# Patient Record
Sex: FEMALE | Race: AMERICAN INDIAN OR ALASKA NATIVE | NOT HISPANIC OR LATINO | Employment: FULL TIME | URBAN - METROPOLITAN AREA
[De-identification: names, ages, dates, MRNs, and addresses within clinical notes are randomized per-mention and may not be internally consistent; named-entity substitution may affect disease eponyms.]

---

## 2019-01-30 ENCOUNTER — TELEPHONE (OUTPATIENT)
Dept: SCHEDULING | Facility: IMAGING CENTER | Age: 24
End: 2019-01-30

## 2019-03-18 ENCOUNTER — OFFICE VISIT (OUTPATIENT)
Dept: MEDICAL GROUP | Facility: PHYSICIAN GROUP | Age: 24
End: 2019-03-18
Payer: COMMERCIAL

## 2019-03-18 ENCOUNTER — HOSPITAL ENCOUNTER (OUTPATIENT)
Facility: MEDICAL CENTER | Age: 24
End: 2019-03-18
Attending: NURSE PRACTITIONER
Payer: COMMERCIAL

## 2019-03-18 VITALS
HEART RATE: 84 BPM | DIASTOLIC BLOOD PRESSURE: 78 MMHG | HEIGHT: 62 IN | BODY MASS INDEX: 24.66 KG/M2 | OXYGEN SATURATION: 98 % | WEIGHT: 134 LBS | RESPIRATION RATE: 12 BRPM | TEMPERATURE: 98 F | SYSTOLIC BLOOD PRESSURE: 100 MMHG

## 2019-03-18 DIAGNOSIS — Z32.02 PREGNANCY TEST NEGATIVE: ICD-10-CM

## 2019-03-18 DIAGNOSIS — Z76.89 ESTABLISHING CARE WITH NEW DOCTOR, ENCOUNTER FOR: ICD-10-CM

## 2019-03-18 DIAGNOSIS — Z11.3 SCREENING FOR STDS (SEXUALLY TRANSMITTED DISEASES): ICD-10-CM

## 2019-03-18 DIAGNOSIS — Z30.09 COUNSELING FOR BIRTH CONTROL, ORAL CONTRACEPTIVES: ICD-10-CM

## 2019-03-18 DIAGNOSIS — Z00.00 ROUTINE HEALTH MAINTENANCE: ICD-10-CM

## 2019-03-18 DIAGNOSIS — R53.82 CHRONIC FATIGUE: ICD-10-CM

## 2019-03-18 LAB
INT CON NEG: NEGATIVE
INT CON POS: POSITIVE
POC URINE PREGNANCY TEST: NEGATIVE

## 2019-03-18 PROCEDURE — 87591 N.GONORRHOEAE DNA AMP PROB: CPT

## 2019-03-18 PROCEDURE — 81025 URINE PREGNANCY TEST: CPT | Performed by: NURSE PRACTITIONER

## 2019-03-18 PROCEDURE — 87491 CHLMYD TRACH DNA AMP PROBE: CPT

## 2019-03-18 PROCEDURE — 99202 OFFICE O/P NEW SF 15 MIN: CPT | Performed by: NURSE PRACTITIONER

## 2019-03-18 RX ORDER — NORGESTIMATE AND ETHINYL ESTRADIOL
KIT
Refills: 6 | COMMUNITY
Start: 2019-02-14 | End: 2019-03-18

## 2019-03-18 RX ORDER — ETHYNODIOL DIACETATE AND ETHINYL ESTRADIOL 1 MG-35MCG
1 KIT ORAL DAILY
Qty: 28 TAB | Status: CANCELLED | OUTPATIENT
Start: 2019-03-18

## 2019-03-18 RX ORDER — ACETAMINOPHEN AND CODEINE PHOSPHATE 120; 12 MG/5ML; MG/5ML
1 SOLUTION ORAL DAILY
Qty: 28 TAB | Status: CANCELLED | OUTPATIENT
Start: 2019-03-18

## 2019-03-18 RX ORDER — NORETHINDRONE ACETATE AND ETHINYL ESTRADIOL 1.5-30(21)
1 KIT ORAL DAILY
Qty: 84 TAB | Refills: 3 | Status: SHIPPED | OUTPATIENT
Start: 2019-03-18 | End: 2019-08-09 | Stop reason: SINTOL

## 2019-03-18 ASSESSMENT — PATIENT HEALTH QUESTIONNAIRE - PHQ9: CLINICAL INTERPRETATION OF PHQ2 SCORE: 0

## 2019-03-18 NOTE — LETTER
[facility]  MATTHEW Ames  [address]  [place] [address]  Fax: [phone]   Authorization for Release/Disclosure of   Protected Health Information   Name: KARUNA JIM : 1995 SSN: [id number]   Address: [address] [place] [address]  [place] [address] Phone:    [phone] (home)    I authorize the entity listed below to release/disclose the PHI below to:   [facility]/MATTHEW Ames and MATTHEW Ames   Provider or Entity Name:  [facility]   Address   City, State, Zip   Phone:      Fax:     Reason for request: continuity of care   Information to be released:    [  ] LAST COLONOSCOPY,  including any PATH REPORT and follow-up  [  ] LAST FIT/COLOGUARD RESULT [  ] LAST DEXA  [  ] LAST MAMMOGRAM  [X] LAST PAP  [  ] LAST LABS [  ] RETINA EXAM REPORT  [  ] IMMUNIZATION RECORDS  [  ] Release all info      [  ] Check here and initial the line next to each item to release ALL health information INCLUDING  _____ Care and treatment for drug and / or alcohol abuse  _____ HIV testing, infection status, or AIDS  _____ Genetic Testing    DATES OF SERVICE OR TIME PERIOD TO BE DISCLOSED: _____________  I understand and acknowledge that:  * This Authorization may be revoked at any time by you in writing, except if your health information has already been used or disclosed.  * Your health information that will be used or disclosed as a result of you signing this authorization could be re-disclosed by the recipient. If this occurs, your re-disclosed health information may no longer be protected by State or Federal laws.  * You may refuse to sign this Authorization. Your refusal will not affect your ability to obtain treatment.  * This Authorization becomes effective upon signing and will  on (date) __________.      If no date is indicated, this Authorization will  one (1) year from the signature date.    Name: Karuna Jim    Signature:   Date:      3/18/2019       PLEASE FAX REQUESTED RECORDS BACK TO: [phone]

## 2019-03-18 NOTE — PROGRESS NOTES
CC:   Chief Complaint   Patient presents with   • Contraception     new patient, birth control, requesting labs       HISTORY OF THE PRESENT ILLNESS: Patient is a 23 y.o. female. This pleasant patient is here today to establish care and discuss issues as listed below.    Health Maintenance: Completed    Chronic fatigue  This is a chronic problem for the patient that is ongoing.  Patient reports that she has been experiencing fatigue for a while.  It was recommended to her to have some lab work checked to see if there may be some answers in her labs.  The patient is finishing up her last 2 college classes which are online.  Patient does work at [employer] at the .    Allergies: Patient has no known allergies.    Current Outpatient Prescriptions Ordered in [facility]   Medication Sig Dispense Refill   • norethindrone-ethinyl estradiol-iron (MICROGESTIN FE1.5/30) 1.5-30 MG-MCG tablet Take 1 Tab by mouth every day. 84 Tab 3     No current Epic-ordered facility-administered medications on file.        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       Past Surgical History:   Procedure Laterality Date   • MENISCUS REPAIR Right 2011       Social History   Substance Use Topics   • Smoking status: Never Smoker   • Smokeless tobacco: Never Used   • Alcohol use Yes      Comment: socially       Social History     Social History Narrative   • No narrative on file       Family History   Problem Relation Age of Onset   • No Known Problems Mother    • No Known Problems Father    • Alcohol/Drug Sister         drugs   • Obesity Brother    • Diabetes Maternal Grandmother    • Cancer Maternal Grandfather         pancreatic cancer   • GI Paternal Grandmother         Liver   • Arthritis Paternal Grandmother    • Other Paternal Grandmother         Chronic back pain   • No Known Problems Paternal Grandfather        ROS:   Constitutional: Positive for fatigue.   "Negative for fever, chills, unexpected weight change, night sweats, body aches, sleep issues.   HEENT:  Negative for headaches, vision changes, hearing changes, ear pain, tinnitus, ear discharge, rhinorrhea, sinus congestion, sneezing, sore throat, and neck pain.    Respiratory:  Negative for cough, shortness of breath, sputum production, hemoptysis, chest congestion, dyspnea, wheezing, and crackles.    Cardiovascular:  Negative for chest pain, palpitations, FRANCISCO, paroxsymal nocturnal dyspnea, orthopnea, and bilateral lower extremity edema.   Gastrointestinal:  Negative for heartburn, nausea, vomiting, abdominal pain, hematochezia, melena, diarrhea, constipation, and greasy/foul-smelling stools.   Genitourinary:  Negative for dysuria, nocturia, polyuria, hematuria, pyuria, urinary urgency, urinary frequency, and urinary incontinence.   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  Negative for myalgias, back pain, and joint pain.   Skin:  Negative for rash, sores, lumps, itching, cyanotic skin color change.   Neurological:  Negative for dizziness, tingling, tremors, focal sensory deficit, focal weakness and headaches.   Endo/Heme/Allergies:  Does not bruise/bleed easily. Denies cold/heat intolerance.   Psychiatric/Behavioral: Negative for depression, suicidal/homicidal ideation and memory loss.        Exam: Blood pressure 100/78, pulse 84, temperature 36.7 °C (98 °F), resp. rate 12, height 1.575 m (5' 2\"), weight 60.8 kg (134 lb), SpO2 98 %. Body mass index is 24.51 kg/m².    General:  Normal appearing. No distress.  HEENT:  Normocephalic. Eyes conjunctiva clear lids without ptosis, pupils equal and reactive to light accommodation, ears normal shape and contour, canals are clear bilaterally, tympanic membranes are benign, nasal mucosa benign, oropharynx is without erythema, edema or exudates.   Neck:  Supple without JVD or bruit. Thyroid is not enlarged.  Pulmonary:  Clear to ausculation. "  Normal effort. No rales, ronchi, or wheezing.  Cardiovascular:  Regular rate and rhythm without murmur. Carotid and radial pulses are intact and equal bilaterally.  Abdomen:  Soft, nontender, nondistended. Normal bowel sounds.  Neurologic:  Grossly nonfocal.  Skin:  Warm and dry.  No obvious lesions.  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  Normal gait. No extremity cyanosis, clubbing, or edema.  Psych: Normal mood and affect. Alert and oriented x3. Judgment and insight is normal.    Assessment/Plan:  1. Chronic fatigue  CBC WITHOUT DIFFERENTIAL    VITAMIN D,25 HYDROXY    VITAMIN B12    TSH WITH REFLEX TO FT4    FERRITIN    IRON/TOTAL IRON BIND    TRANSFERRIN    Basic Metabolic Panel   2. Screening for STDs (sexually transmitted diseases)  CHLAMYDIA/GC PCR URINE OR SWAB   3. Counseling for birth control, oral contraceptives  norethindrone-ethinyl estradiol-iron (MICROGESTIN FE1.5/30) 1.5-30 MG-MCG tablet   4. Pregnancy test negative  POCT Pregnancy - negative   5. Routine health maintenance     6. Establishing care with new doctor, encounter for       Educated in proper administration of medication(s) ordered today including safety, possible SE, risks, benefits, rationale and alternatives to therapy.   Supportive care, differential diagnoses, and indications for immediate follow-up discussed with patient.    Pathogenesis of diagnosis discussed including typical length and natural progression.    Instructed to return to clinic or nearest emergency department for any change in condition, further concerns, or worsening of symptoms.  Patient states understanding of the plan of care and discharge instructions.    Consent for records release signed to order medical records from previous PCP, Dr. Jennie Vaughn, and [facility] for recent Pap smear.    Return for Follow up Labs.    Please note that this dictation  was created using voice recognition software. I have made every reasonable attempt to correct obvious errors, but I expect that there are errors of grammar and possibly content that I did not discover before finalizing the note.

## 2019-03-18 NOTE — ASSESSMENT & PLAN NOTE
This is a chronic problem for the patient that is ongoing.  Patient reports that she has been experiencing fatigue for a while.  It was recommended to her to have some lab work checked to see if there may be some answers in her labs.  The patient is finishing up her last 2 college classes which are online.  Patient does work at [employer] at the .

## 2019-03-18 NOTE — LETTER
[facility]  MATTHEW Ames  [address]  [place] [address]  Fax: [phone]   Authorization for Release/Disclosure of   Protected Health Information   Name: KARUNA JIM : 1995 SSN: [id number]   Address: [address] [place] [address]  [place] [address] Phone:    [phone] (home)    I authorize the entity listed below to release/disclose the PHI below to:   [facility]/MATTHEW Ames and MATTHEW Ames   Provider or Entity Name:  Dr. Jennie Vaughn   [facility]   Phone: [phone]      Fax: [phone]     Reason for request: continuity of care   Information to be released:    [  ] LAST COLONOSCOPY,  including any PATH REPORT and follow-up  [  ] LAST FIT/COLOGUARD RESULT [  ] LAST DEXA  [  ] LAST MAMMOGRAM  [  ] LAST PAP  [  ] LAST LABS [  ] RETINA EXAM REPORT  [  ] IMMUNIZATION RECORDS  [  ] Release all info      [  ] Check here and initial the line next to each item to release ALL health information INCLUDING  _____ Care and treatment for drug and / or alcohol abuse  _____ HIV testing, infection status, or AIDS  _____ Genetic Testing    DATES OF SERVICE OR TIME PERIOD TO BE DISCLOSED: _____________  I understand and acknowledge that:  * This Authorization may be revoked at any time by you in writing, except if your health information has already been used or disclosed.  * Your health information that will be used or disclosed as a result of you signing this authorization could be re-disclosed by the recipient. If this occurs, your re-disclosed health information may no longer be protected by State or Federal laws.  * You may refuse to sign this Authorization. Your refusal will not affect your ability to obtain treatment.  * This Authorization becomes effective upon signing and will  on (date) __________.      If no date is indicated, this Authorization will  one (1) year from the signature date.    Name: Karuna  Acosta    Signature:   Date:     3/18/2019       PLEASE FAX REQUESTED RECORDS BACK TO: [phone]

## 2019-03-19 LAB
C TRACH DNA SPEC QL NAA+PROBE: NEGATIVE
N GONORRHOEA DNA SPEC QL NAA+PROBE: NEGATIVE
SPECIMEN SOURCE: NORMAL

## 2019-04-17 ENCOUNTER — APPOINTMENT (OUTPATIENT)
Dept: MEDICAL GROUP | Facility: PHYSICIAN GROUP | Age: 24
End: 2019-04-17
Payer: COMMERCIAL

## 2019-07-02 ENCOUNTER — OFFICE VISIT (OUTPATIENT)
Dept: MEDICAL GROUP | Facility: PHYSICIAN GROUP | Age: 24
End: 2019-07-02
Payer: COMMERCIAL

## 2019-07-02 VITALS
HEIGHT: 62 IN | BODY MASS INDEX: 26.68 KG/M2 | HEART RATE: 74 BPM | RESPIRATION RATE: 12 BRPM | DIASTOLIC BLOOD PRESSURE: 82 MMHG | TEMPERATURE: 98.9 F | SYSTOLIC BLOOD PRESSURE: 106 MMHG | OXYGEN SATURATION: 99 % | WEIGHT: 145 LBS

## 2019-07-02 DIAGNOSIS — R53.82 CHRONIC FATIGUE: ICD-10-CM

## 2019-07-02 DIAGNOSIS — R63.5 WEIGHT GAIN: ICD-10-CM

## 2019-07-02 DIAGNOSIS — Z11.3 SCREENING FOR STDS (SEXUALLY TRANSMITTED DISEASES): ICD-10-CM

## 2019-07-02 DIAGNOSIS — L81.4 SOLAR LENTIGO: ICD-10-CM

## 2019-07-02 PROBLEM — K13.0 LIP LESION: Status: ACTIVE | Noted: 2019-07-02

## 2019-07-02 PROCEDURE — 99214 OFFICE O/P EST MOD 30 MIN: CPT | Performed by: NURSE PRACTITIONER

## 2019-07-03 NOTE — ASSESSMENT & PLAN NOTE
"This is a chronic problem for the patient that has been present for 1 to 2 years.  Patient is affected on her upper lip and right forearm.  The patient was traveling to [place] last week and had a sunburn and noticed that the discoloring on her upper lip had become darker, larger, and raised.  Patient denies any pain or itching to the areas, states that she is only aware that it is there because she sees it.  Patient reports that \"liver spots run in her family\" and it was recommended to her to have some lab work completed.  "

## 2019-07-04 PROBLEM — R63.5 WEIGHT GAIN: Status: ACTIVE | Noted: 2019-07-04

## 2019-07-04 NOTE — ASSESSMENT & PLAN NOTE
"This is a new problem for the patient that is worsening.  The patient has had an 11 pound weight gain in the last 4 months despite healthy diet and exercise.  Patient is requesting lab work.  Vitals 3/18/2019 7/2/2019   WEIGHT 134 145   HEIGHT 5' 2\" 5' 2\"   BMI 24.51 kg/m2 26.52 kg/m2     "

## 2019-07-04 NOTE — ASSESSMENT & PLAN NOTE
This is a chronic problem for the patient that is ongoing.  Patient reports that she has been experiencing fatigue for a while. The patient has also been experiencing weight gain despite healthy diet and exercise.

## 2019-07-04 NOTE — PROGRESS NOTES
"CC:   Chief Complaint   Patient presents with   • Nevus     freckles around lips, peeling, family history liver spots, weight gain     HISTORY OF THE PRESENT ILLNESS: Patient is a 23 y.o. female. This pleasant patient is here today to discuss skin color changes on upper lip and request STI screening d/t new partner.    Health Maintenance: Completed    Solar lentigo  This is a chronic problem for the patient that has been present for 1 to 2 years.  Patient is affected on her upper lip and right forearm.  The patient was traveling to [place] last week and had a sunburn and noticed that the discoloring on her upper lip had become darker, larger, and raised.  Patient denies any pain or itching to the areas, states that she is only aware that it is there because she sees it.  Patient reports that \"liver spots run in her family\" and it was recommended to her to have some lab work completed.    Chronic fatigue  This is a chronic problem for the patient that is ongoing.  Patient reports that she has been experiencing fatigue for a while. The patient has also been experiencing weight gain despite healthy diet and exercise.    Weight gain  This is a new problem for the patient that is worsening.  The patient has had an 11 pound weight gain in the last 4 months despite healthy diet and exercise.  Patient is requesting lab work.  Vitals 3/18/2019 7/2/2019   WEIGHT 134 145   HEIGHT 5' 2\" 5' 2\"   BMI 24.51 kg/m2 26.52 kg/m2     Allergies: Patient has no known allergies.    Current Outpatient Prescriptions Ordered in [facility]   Medication Sig Dispense Refill   • norethindrone-ethinyl estradiol-iron (MICROGESTIN FE1.5/30) 1.5-30 MG-MCG tablet Take 1 Tab by mouth every day. 84 Tab 3     No current Epic-ordered facility-administered medications on file.        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       Past Surgical History:   Procedure Laterality Date   • MENISCUS " "REPAIR Right 2011       Social History   Substance Use Topics   • Smoking status: Never Smoker   • Smokeless tobacco: Never Used   • Alcohol use Yes      Comment: socially       Social History     Social History Narrative   • No narrative on file       Family History   Problem Relation Age of Onset   • No Known Problems Mother    • No Known Problems Father    • Alcohol/Drug Sister         drugs   • Obesity Brother    • Diabetes Maternal Grandmother    • Cancer Maternal Grandfather         pancreatic cancer   • GI Paternal Grandmother         Liver   • Arthritis Paternal Grandmother    • Other Paternal Grandmother         Chronic back pain   • No Known Problems Paternal Grandfather      ROS:   Constitutional: Positive for fatigue and weight gain.  Negative for fever, chills, night sweats, body aches, sleep issues.   HEENT:  Negative for headaches, vision changes, hearing changes, ear pain, tinnitus, ear discharge, rhinorrhea, sinus congestion, sneezing, sore throat, and neck pain.    Respiratory:  Negative for cough, shortness of breath, sputum production, hemoptysis, chest congestion, dyspnea, wheezing, and crackles.    Cardiovascular:  Negative for chest pain, palpitations, FRANCISCO, paroxsymal nocturnal dyspnea, orthopnea, and bilateral lower extremity edema.   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  Negative for myalgias, back pain, and joint pain.   Skin: Solar lentigo spots on upper lip and right forearm.  Neurological:  Negative for dizziness, tingling, tremors, focal sensory deficit, focal weakness and headaches.   Psychiatric/Behavioral: Negative for depression, suicidal/homicidal ideation and memory loss.        Exam: /82   Pulse 74   Temp 37.2 °C (98.9 °F)   Resp 12   Ht 1.575 m (5' 2\")   Wt 65.8 kg (145 lb)   SpO2 99%  Body mass index is 26.52 kg/m².    General:  Normal appearing. No distress.  HEENT:  Normocephalic. Eyes conjunctiva clear lids without ptosis, " pupils equal and reactive to light accommodation, ears normal shape and contour.  Pulmonary:  Clear to ausculation.  Normal effort. No rales, ronchi, or wheezing.  Cardiovascular:  Regular rate and rhythm without murmur.  Neurologic:  Grossly nonfocal.  Skin: Solar lentigo spots on upper lip and right forearm. Warm and dry.  No obvious lesions.  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  Normal gait. No extremity cyanosis, clubbing, or edema.  Psych: Normal mood and affect. Alert and oriented x3. Judgment and insight is normal.    Assessment/Plan:  1. Solar lentigo  Use sunscreen when outside   2. Chronic fatigue  VITAMIN D,25 HYDROXY    VITAMIN B12  Previously ordered labs not yet completed   3. Weight gain  HEMOGLOBIN A1C    Comp Metabolic Panel    Lipid Profile    DHEA SULFATE    ANDROSTENEDIONE    SEX HORMONE BINDING GLOBULIN    FSH/LH    PROLACTIN    TESTOSTERONE, FREE AND TOTAL  TSH WITH REFLEX TO FT4   4. Screening for STDs (sexually transmitted diseases)  HIV AG/AB COMBO ASSAY SCREENING  RPR (SYPHILIS)  HSV 1/2 IGG W/ TYPE SPECIFIC RFLX     Educated in proper administration of medication(s) ordered today including safety, possible SE, risks, benefits, rationale and alternatives to therapy.   Supportive care, differential diagnoses, and indications for immediate follow-up discussed with patient.    Pathogenesis of diagnosis discussed including typical length and natural progression.    Instructed to return to clinic or nearest emergency department for any change in condition, further concerns, or worsening of symptoms.  Patient states understanding of the plan of care and discharge instructions.    Return in 2 weeks (on 7/15/2019) for Pap, Follow up Labs.    Please note that this dictation was created using voice recognition software. I have made every reasonable attempt to correct obvious errors, but I  expect that there are errors of grammar and possibly content that I did not discover before finalizing the note.

## 2019-07-15 ENCOUNTER — HOSPITAL ENCOUNTER (OUTPATIENT)
Facility: MEDICAL CENTER | Age: 24
End: 2019-07-15
Attending: NURSE PRACTITIONER
Payer: COMMERCIAL

## 2019-07-15 ENCOUNTER — OFFICE VISIT (OUTPATIENT)
Dept: MEDICAL GROUP | Facility: PHYSICIAN GROUP | Age: 24
End: 2019-07-15
Payer: COMMERCIAL

## 2019-07-15 VITALS
HEART RATE: 105 BPM | TEMPERATURE: 99 F | SYSTOLIC BLOOD PRESSURE: 108 MMHG | WEIGHT: 146 LBS | HEIGHT: 62 IN | BODY MASS INDEX: 26.87 KG/M2 | OXYGEN SATURATION: 95 % | RESPIRATION RATE: 12 BRPM | DIASTOLIC BLOOD PRESSURE: 64 MMHG

## 2019-07-15 DIAGNOSIS — Z12.4 SCREENING FOR MALIGNANT NEOPLASM OF CERVIX: ICD-10-CM

## 2019-07-15 DIAGNOSIS — N89.8 VAGINAL DISCHARGE: ICD-10-CM

## 2019-07-15 DIAGNOSIS — Z11.3 SCREENING FOR STDS (SEXUALLY TRANSMITTED DISEASES): ICD-10-CM

## 2019-07-15 DIAGNOSIS — L81.4 SOLAR LENTIGO: ICD-10-CM

## 2019-07-15 DIAGNOSIS — Z01.419 WELL WOMAN EXAM WITH ROUTINE GYNECOLOGICAL EXAM: ICD-10-CM

## 2019-07-15 DIAGNOSIS — D22.9 NEVUS: ICD-10-CM

## 2019-07-15 PROCEDURE — 87480 CANDIDA DNA DIR PROBE: CPT

## 2019-07-15 PROCEDURE — 87510 GARDNER VAG DNA DIR PROBE: CPT

## 2019-07-15 PROCEDURE — 87591 N.GONORRHOEAE DNA AMP PROB: CPT

## 2019-07-15 PROCEDURE — 87491 CHLMYD TRACH DNA AMP PROBE: CPT

## 2019-07-15 PROCEDURE — 87660 TRICHOMONAS VAGIN DIR PROBE: CPT

## 2019-07-15 PROCEDURE — 88175 CYTOPATH C/V AUTO FLUID REDO: CPT

## 2019-07-15 PROCEDURE — 99395 PREV VISIT EST AGE 18-39: CPT | Performed by: NURSE PRACTITIONER

## 2019-07-15 NOTE — PROGRESS NOTES
Subjective:     CC:   Chief Complaint   Patient presents with   • Gynecologic Exam     pap     HPI:   Karuna Shane is a 23 y.o. female presents for a routine preventive health exam.    Nevus  Patient is requesting a referral to dermatology to discuss a mole on her forehead that she would like removed.     Ob-Gyn/ History:    /Para: 0/0  Patient has GYN provider: PCP  Last Pap Smear: Around 3 years ago. No history of abnormal pap smears.  Gyn Surgery: None.  Current Contraceptive Method: Pill. Is currently sexually active.  Last menstrual period: About 1 week ago.  Periods regular.  Hx. STD's: None, positive history BV.  Urinary incontinence: None  Folate intake: No    Menses every month with 3-7 days moderate bleeding. Cramping is moderate.   She does take OTC analgesics for cramps.  No significant bloating/fluid retention, pelvic pain, or dyspareunia. No vaginal discharge.   No breast tenderness, mass, nipple discharge, changes in size or contour, or abnormal cyclic discomfort.  She does not perform regular self breast exams.    Health Maintenance  Advanced directive: NA   Osteoporosis Screen/ DEXA: NA   PT/vit D for falls prevention: NA   Cholesterol Screening: Previously ordered   Diabetes Screening: Previously ordered  AAA Screening: NA   Aspirin Use: NA    Diet: Not the best, im working on it   Exercise: Trying to be more physically active  Substance Abuse: None   Safe in relationship. Yes  Seat belts. Sun protection used.    Cancer screening  Colorectal Cancer Screening: NA    Lung Cancer Screening: NA    Cervical Cancer Screenin/15/2019   Breast Cancer Screening: NA     Infectious disease screening/Immunizations  STI Screening: Ordered with PAP and previously ordered  Practices safe sex.  HIV Screening: Previously ordered   Hepatitis C Screening: NA   Immunizations:    Influenza: Declines    HPV:  Up to date, records have been requested    Tetanus: Up to date, records have been  requested   Shingles: NA    Pneumococcal : NA     Other immunizations: NA     She  has a past medical history of Band, amniotic (1994).  She  has a past surgical history that includes meniscus repair (Right, 2011).    Family History   Problem Relation Age of Onset   • No Known Problems Mother    • No Known Problems Father    • Alcohol/Drug Sister         drugs   • Obesity Brother    • Diabetes Maternal Grandmother    • Cancer Maternal Grandfather         pancreatic cancer   • GI Paternal Grandmother         Liver   • Arthritis Paternal Grandmother    • Other Paternal Grandmother         Chronic back pain   • No Known Problems Paternal Grandfather        Social History     Social History   • Marital status: Single     Spouse name: N/A   • Number of children: N/A   • Years of education: N/A     Occupational History   • Not on file.     Social History Main Topics   • Smoking status: Never Smoker   • Smokeless tobacco: Never Used   • Alcohol use Yes      Comment: socially   • Drug use: No   • Sexual activity: Yes     Partners: Male     Birth control/ protection: Pill     Other Topics Concern   • Not on file     Social History Narrative   • No narrative on file       Patient Active Problem List    Diagnosis Date Noted   • Nevus 07/15/2019   • Weight gain 07/04/2019   • Solar lentigo 07/02/2019   • Chronic fatigue 03/18/2019       Current Outpatient Prescriptions   Medication Sig Dispense Refill   • norethindrone-ethinyl estradiol-iron (MICROGESTIN FE1.5/30) 1.5-30 MG-MCG tablet Take 1 Tab by mouth every day. 84 Tab 3     No current facility-administered medications for this visit.      No Known Allergies    Review of Systems  Constitutional: Positive for fatigue and weight gain. Negative for fever, chills.   HENT:  Negative for congestion.    Eyes:  Negative for pain.   Respiratory:  Negative for cough and shortness of breath.    Cardiovascular:  Negative for leg swelling.   Gastrointestinal:  Negative for nausea,  "vomiting, abdominal pain and diarrhea.   Genitourinary:  Negative for dysuria and hematuria.   Skin: Positive for mole on forehead and solar lentigo to upper lip and right forearm. Negative for rash.   Neurological:  Negative for dizziness, focal weakness and headaches.   Endo/Heme/Allergies:  Does not bruise/bleed easily.   Psychiatric/Behavioral:  Negative for depression.  The patient is not nervous/anxious.      Objective:     /64   Pulse (!) 105   Temp 37.2 °C (99 °F)   Resp 12   Ht 1.575 m (5' 2\")   Wt 66.2 kg (146 lb)   SpO2 95%   BMI 26.70 kg/m²   Body mass index is 26.7 kg/m².  Wt Readings from Last 4 Encounters:   07/15/19 66.2 kg (146 lb)   07/02/19 65.8 kg (145 lb)   03/18/19 60.8 kg (134 lb)     Physical Exam:  Constitutional:  Well-developed and well-nourished. Not diaphoretic. No distress.   Skin: Solar lentigo spots on upper lip and right forearm, mole on forehead. Warm and dry. Skin is warm and dry. No rash noted.  Head:  Atraumatic without lesions.  Eyes:  Conjunctivae and extraocular motions are normal. Pupils are equal, round, and reactive to light. No scleral icterus.   Ears:   External ears unremarkable. Tympanic membranes clear and intact.  Nose:  Nares patent. Septum midline. Turbinates without erythema nor edema. No discharge.   Mouth/Throat:  Dentition is good. Tongue normal. Oropharynx is clear and moist. Posterior pharynx without erythema or exudates.  Neck:  Supple, trachea midline. Normal range of motion. No thyromegaly present. No lymphadenopathy--cervical or supraclavicular.  Cardiovascular:  Regular rate and rhythm, S1 and S2 without murmur, rubs, or gallops.    Breast:  Breast exam deferred. Discussed monthly self exams and what to look for, including peau d'orange or nipple retraction, discharge, breasts moving freely and equally without restriction, axillary/supraclavicular adenopathy, or palpable masses/nodules.  Abdomen:  Soft, non tender, and without distention. " Active bowel sounds in all four quadrants. No rebound, guarding, masses or HSM.  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 No cyanosis, clubbing, erythema, nor edema.  :  Perineum and external genitalia normal without rash. Vagina with white and thick discharge. Cervix without visible lesions or discharge. Bimanual exam without adnexal masses or cervical motion tenderness.  Musculoskeletal:  All major joints AROM full in all directions without pain.  Neurological:  Alert and oriented x 3. No cranial nerve deficit. Sensation intact.  Psychiatric:  Behavior, mood, and affect are appropriate.    A chaperone was offered to the patient during today's exam. Chaperone name: Dariel was present.    Assessment and Plan:     1. Well woman exam with routine gynecological exam     2. Screening for malignant neoplasm of cervix  THINPREP PAP W/CTNG   3. Vaginal discharge  VAGINAL PATHOGENS DNA PANEL   4. Screening for STDs (sexually transmitted diseases)     5. Solar lentigo  REFERRAL TO DERMATOLOGY   6. Nevus       HCM:  Updated, records previously requested   Labs per orders  Immunizations per orders, records previously requested    Patient Counseling:  --Discussed moderation in sodium/caffeine intake, saturated fat and cholesterol, caloric balance, sufficient fresh fruits/vegetables, fiber, iron, and 0.4-0.8mg of folate supplement per day (for females capable of pregnancy).  --Discussed brushing, flossing, and dental visits.   --Encouraged regular exercise.   --Discussed tobacco, alcohol, or other drug use; availability of treatment for abuse.   --Discussed sexually transmitted infections, partner selection, use of condoms, avoidance of unintended pregnancy and contraceptive alternatives.  --Discussed the issue of estrogen replacement, calcium supplement, and the daily use of baby aspirin.  --Injury prevention: Discussed safety belts, safety helmets, smoke detector, etc.    Follow-up: Return in 3 days  (on 7/18/2019) for Follow up Labs.

## 2019-07-16 ENCOUNTER — TELEPHONE (OUTPATIENT)
Dept: MEDICAL GROUP | Facility: PHYSICIAN GROUP | Age: 24
End: 2019-07-16

## 2019-07-16 LAB
C TRACH DNA GENITAL QL NAA+PROBE: POSITIVE
CANDIDA DNA VAG QL PROBE+SIG AMP: NEGATIVE
CYTOLOGY REG CYTOL: ABNORMAL
G VAGINALIS DNA VAG QL PROBE+SIG AMP: NEGATIVE
N GONORRHOEA DNA GENITAL QL NAA+PROBE: NEGATIVE
SPECIMEN SOURCE: ABNORMAL
T VAGINALIS DNA VAG QL PROBE+SIG AMP: NEGATIVE

## 2019-07-16 NOTE — TELEPHONE ENCOUNTER
Phone Number Called: [phone] (home)     Call outcome: spoke to patient regarding message below    Message: patient cancelled appointment for 7/18, she will get labs done before next appointment.

## 2019-07-16 NOTE — TELEPHONE ENCOUNTER
----- Message from MATTHEW Ames sent at 7/15/2019  7:30 PM PDT -----  Regarding: Follow up appointment  The patient has an appointment scheduled on 7/18/2019 to review labs.  It does not look like she has had them completed yet.  Can you please call the patient and let her know that she can cancel her 7/18/2019 appointment and reschedule if she needs more time to get her labs done.    Thank you,    Ines

## 2019-07-16 NOTE — ASSESSMENT & PLAN NOTE
Patient is requesting a referral to dermatology to discuss a mole on her forehead that she would like removed.

## 2019-07-17 ENCOUNTER — TELEPHONE (OUTPATIENT)
Dept: URGENT CARE | Facility: PHYSICIAN GROUP | Age: 24
End: 2019-07-17

## 2019-07-17 ENCOUNTER — TELEPHONE (OUTPATIENT)
Dept: MEDICAL GROUP | Facility: PHYSICIAN GROUP | Age: 24
End: 2019-07-17

## 2019-07-17 DIAGNOSIS — A74.9 CHLAMYDIA INFECTION: ICD-10-CM

## 2019-07-17 RX ORDER — AZITHROMYCIN 1 G/1
1 POWDER, FOR SUSPENSION ORAL ONCE
Qty: 1 EACH | Refills: 0 | Status: SHIPPED | OUTPATIENT
Start: 2019-07-17 | End: 2019-07-17

## 2019-07-17 NOTE — TELEPHONE ENCOUNTER
1. Caller Name: Karuna Shane                                         Call Back Number: [phone] (home)       Patient approves a detailed voicemail message: N\A    Patient called she has more questions on her results as well as last results she had. please call when available. Thank You.

## 2019-07-17 NOTE — PROGRESS NOTES
1. Chlamydia infection  Advised patient to return in one month to test for cure.  Also advised patient to notify partner to get tested.  - azithromycin (ZITHROMAX) 1 GM powder; Take 1 Packet by mouth Once for 1 dose.  Dispense: 1 Each; Refill: 0  - cefTRIAXone (ROCEPHIN) 250 mg, lidocaine (XYLOCAINE) 1 % 0.9 mL for IM use; 250 mg by Intramuscular route Once.

## 2019-07-17 NOTE — TELEPHONE ENCOUNTER
Returned patients call per her request. There was no answer and voicemail was full. Patient was previously sent a [facility] message regarding results.

## 2019-07-17 NOTE — TELEPHONE ENCOUNTER
Called patient to notify her of positive chlamydia result.   Patient to come to clinic for MA visit for rocephin IM injection and go to her pharmacy to  azithromycin 1 gm x 1 dose. Advised patient to return in one month to test for cure as well as to have her partner get tested.

## 2019-07-19 ENCOUNTER — HOSPITAL ENCOUNTER (OUTPATIENT)
Dept: LAB | Facility: MEDICAL CENTER | Age: 24
End: 2019-07-19
Attending: NURSE PRACTITIONER
Payer: COMMERCIAL

## 2019-07-19 ENCOUNTER — NON-PROVIDER VISIT (OUTPATIENT)
Dept: MEDICAL GROUP | Facility: PHYSICIAN GROUP | Age: 24
End: 2019-07-19
Payer: COMMERCIAL

## 2019-07-19 VITALS
SYSTOLIC BLOOD PRESSURE: 108 MMHG | BODY MASS INDEX: 26.87 KG/M2 | HEART RATE: 105 BPM | WEIGHT: 146 LBS | OXYGEN SATURATION: 95 % | HEIGHT: 62 IN | TEMPERATURE: 99 F | RESPIRATION RATE: 12 BRPM | DIASTOLIC BLOOD PRESSURE: 64 MMHG

## 2019-07-19 DIAGNOSIS — R63.5 WEIGHT GAIN: ICD-10-CM

## 2019-07-19 DIAGNOSIS — Z11.3 SCREENING FOR STDS (SEXUALLY TRANSMITTED DISEASES): ICD-10-CM

## 2019-07-19 DIAGNOSIS — A74.9 CHLAMYDIA INFECTION: ICD-10-CM

## 2019-07-19 DIAGNOSIS — R53.82 CHRONIC FATIGUE: ICD-10-CM

## 2019-07-19 LAB
25(OH)D3 SERPL-MCNC: 16 NG/ML (ref 30–100)
ALBUMIN SERPL BCP-MCNC: 3.9 G/DL (ref 3.2–4.9)
ALBUMIN/GLOB SERPL: 1.3 G/DL
ALP SERPL-CCNC: 43 U/L (ref 30–99)
ALT SERPL-CCNC: 18 U/L (ref 2–50)
ANION GAP SERPL CALC-SCNC: 10 MMOL/L (ref 0–11.9)
AST SERPL-CCNC: 15 U/L (ref 12–45)
BILIRUB SERPL-MCNC: 0.6 MG/DL (ref 0.1–1.5)
BUN SERPL-MCNC: 14 MG/DL (ref 8–22)
CALCIUM SERPL-MCNC: 9.3 MG/DL (ref 8.5–10.5)
CHLORIDE SERPL-SCNC: 105 MMOL/L (ref 96–112)
CHOLEST SERPL-MCNC: 229 MG/DL (ref 100–199)
CO2 SERPL-SCNC: 23 MMOL/L (ref 20–33)
CREAT SERPL-MCNC: 0.89 MG/DL (ref 0.5–1.4)
GLOBULIN SER CALC-MCNC: 3.1 G/DL (ref 1.9–3.5)
GLUCOSE SERPL-MCNC: 77 MG/DL (ref 65–99)
HDLC SERPL-MCNC: 85 MG/DL
LDLC SERPL CALC-MCNC: 122 MG/DL
POTASSIUM SERPL-SCNC: 3.8 MMOL/L (ref 3.6–5.5)
PROT SERPL-MCNC: 7 G/DL (ref 6–8.2)
SODIUM SERPL-SCNC: 138 MMOL/L (ref 135–145)
TRIGL SERPL-MCNC: 109 MG/DL (ref 0–149)
VIT B12 SERPL-MCNC: 197 PG/ML (ref 211–911)

## 2019-07-19 PROCEDURE — 80053 COMPREHEN METABOLIC PANEL: CPT

## 2019-07-19 PROCEDURE — 80061 LIPID PANEL: CPT

## 2019-07-19 PROCEDURE — 96372 THER/PROPH/DIAG INJ SC/IM: CPT | Performed by: NURSE PRACTITIONER

## 2019-07-19 PROCEDURE — 82607 VITAMIN B-12: CPT

## 2019-07-19 PROCEDURE — 36415 COLL VENOUS BLD VENIPUNCTURE: CPT

## 2019-07-19 PROCEDURE — 82306 VITAMIN D 25 HYDROXY: CPT

## 2019-07-21 PROBLEM — E53.8 VITAMIN B12 DEFICIENCY: Status: ACTIVE | Noted: 2019-07-21

## 2019-07-21 PROBLEM — E55.9 VITAMIN D DEFICIENCY: Status: ACTIVE | Noted: 2019-07-21

## 2019-07-21 PROBLEM — R87.610 ATYPICAL SQUAMOUS CELLS OF UNDETERMINED SIGNIFICANCE (ASCUS) ON PAPANICOLAOU SMEAR OF CERVIX: Status: ACTIVE | Noted: 2019-07-21

## 2019-08-08 NOTE — PROGRESS NOTES
Karuna is calling asking if we can switch back to her old BCP. She states that Microgestin fe is giving her side affects (more emotional) and a low tolerance to alcohol) she would like to go back to the tri-- sprint.     Thank you,  Reinaldo Sanchez Ass't          Was the patient seen in the last year in this department? Yes    Does patient have an active prescription for medications requested? No     Received Request Via: Patient

## 2019-08-09 RX ORDER — NORGESTIMATE AND ETHINYL ESTRADIOL
1 KIT DAILY
Qty: 28 TAB | Refills: 6 | Status: SHIPPED | OUTPATIENT
Start: 2019-08-09 | End: 2020-06-15 | Stop reason: SDUPTHER

## 2019-10-16 ENCOUNTER — APPOINTMENT (RX ONLY)
Dept: URBAN - METROPOLITAN AREA CLINIC 4 | Facility: CLINIC | Age: 24
Setting detail: DERMATOLOGY
End: 2019-10-16

## 2019-10-16 DIAGNOSIS — L81.1 CHLOASMA: ICD-10-CM

## 2019-10-16 DIAGNOSIS — D22 MELANOCYTIC NEVI: ICD-10-CM

## 2019-10-16 PROBLEM — D22.39 MELANOCYTIC NEVI OF OTHER PARTS OF FACE: Status: ACTIVE | Noted: 2019-10-16

## 2019-10-16 PROBLEM — J45.909 UNSPECIFIED ASTHMA, UNCOMPLICATED: Status: ACTIVE | Noted: 2019-10-16

## 2019-10-16 PROCEDURE — 99202 OFFICE O/P NEW SF 15 MIN: CPT

## 2019-10-16 PROCEDURE — ? COUNSELING

## 2019-10-16 PROCEDURE — ? ADDITIONAL NOTES

## 2019-10-16 ASSESSMENT — LOCATION DETAILED DESCRIPTION DERM
LOCATION DETAILED: INFERIOR MID FOREHEAD
LOCATION DETAILED: RIGHT UPPER CUTANEOUS LIP
LOCATION DETAILED: PHILTRUM

## 2019-10-16 ASSESSMENT — LOCATION SIMPLE DESCRIPTION DERM
LOCATION SIMPLE: UPPER LIP
LOCATION SIMPLE: RIGHT LIP
LOCATION SIMPLE: INFERIOR FOREHEAD

## 2019-10-16 ASSESSMENT — LOCATION ZONE DERM
LOCATION ZONE: FACE
LOCATION ZONE: LIP

## 2019-10-16 NOTE — PROCEDURE: ADDITIONAL NOTES
Detail Level: Simple
Additional Notes: Discussed treatment options. Patient would like to trial broad spectrum sun screen as she feels sun has the biggest effect on it. Will consider other treatments ie hydroquinone in the future

## 2020-03-05 ENCOUNTER — OFFICE VISIT (OUTPATIENT)
Dept: MEDICAL GROUP | Facility: PHYSICIAN GROUP | Age: 25
End: 2020-03-05
Payer: COMMERCIAL

## 2020-03-05 ENCOUNTER — HOSPITAL ENCOUNTER (OUTPATIENT)
Facility: MEDICAL CENTER | Age: 25
End: 2020-03-05
Attending: NURSE PRACTITIONER
Payer: COMMERCIAL

## 2020-03-05 VITALS
SYSTOLIC BLOOD PRESSURE: 100 MMHG | RESPIRATION RATE: 18 BRPM | TEMPERATURE: 97.7 F | BODY MASS INDEX: 28.34 KG/M2 | DIASTOLIC BLOOD PRESSURE: 60 MMHG | HEIGHT: 62 IN | HEART RATE: 83 BPM | WEIGHT: 154 LBS | OXYGEN SATURATION: 96 %

## 2020-03-05 DIAGNOSIS — Z11.3 SCREENING FOR STDS (SEXUALLY TRANSMITTED DISEASES): ICD-10-CM

## 2020-03-05 DIAGNOSIS — N30.01 ACUTE CYSTITIS WITH HEMATURIA: ICD-10-CM

## 2020-03-05 PROBLEM — R30.0 DYSURIA: Status: ACTIVE | Noted: 2020-03-05

## 2020-03-05 LAB
APPEARANCE UR: CLEAR
BILIRUB UR STRIP-MCNC: NEGATIVE MG/DL
COLOR UR AUTO: YELLOW
GLUCOSE UR STRIP.AUTO-MCNC: NEGATIVE MG/DL
KETONES UR STRIP.AUTO-MCNC: NEGATIVE MG/DL
LEUKOCYTE ESTERASE UR QL STRIP.AUTO: NORMAL
NITRITE UR QL STRIP.AUTO: NORMAL
PH UR STRIP.AUTO: 6 [PH] (ref 5–8)
PROT UR QL STRIP: NORMAL MG/DL
RBC UR QL AUTO: NORMAL
SP GR UR STRIP.AUTO: 1.03
UROBILINOGEN UR STRIP-MCNC: 0.2 MG/DL

## 2020-03-05 PROCEDURE — 87086 URINE CULTURE/COLONY COUNT: CPT

## 2020-03-05 PROCEDURE — 87591 N.GONORRHOEAE DNA AMP PROB: CPT

## 2020-03-05 PROCEDURE — 87077 CULTURE AEROBIC IDENTIFY: CPT

## 2020-03-05 PROCEDURE — 87186 SC STD MICRODIL/AGAR DIL: CPT

## 2020-03-05 PROCEDURE — 87491 CHLMYD TRACH DNA AMP PROBE: CPT

## 2020-03-05 PROCEDURE — 81002 URINALYSIS NONAUTO W/O SCOPE: CPT | Performed by: NURSE PRACTITIONER

## 2020-03-05 PROCEDURE — 99214 OFFICE O/P EST MOD 30 MIN: CPT | Performed by: NURSE PRACTITIONER

## 2020-03-05 RX ORDER — NITROFURANTOIN 25; 75 MG/1; MG/1
100 CAPSULE ORAL 2 TIMES DAILY
Qty: 10 CAP | Refills: 0 | Status: SHIPPED | OUTPATIENT
Start: 2020-03-05 | End: 2020-03-10

## 2020-03-05 RX ORDER — PHENAZOPYRIDINE HYDROCHLORIDE 200 MG/1
200 TABLET, FILM COATED ORAL 3 TIMES DAILY PRN
Qty: 6 TAB | Refills: 0 | Status: SHIPPED | OUTPATIENT
Start: 2020-03-05 | End: 2020-07-02

## 2020-03-05 ASSESSMENT — PATIENT HEALTH QUESTIONNAIRE - PHQ9: CLINICAL INTERPRETATION OF PHQ2 SCORE: 0

## 2020-03-05 NOTE — PROGRESS NOTES
"CC:   Chief Complaint   Patient presents with   • UTI     x 2 weeks     HISTORY OF THE PRESENT ILLNESS: Patient is a 24 y.o. female. This pleasant patient is here today to discuss UTI symptoms present for 2 weeks.     Health Maintenance: Completed    Dysuria  New problem for the patient that started about 2 weeks ago.  Patient states that she had sudden onset of \"really bad symptoms\" including urinary urgency, urinary frequency, dysuria, hematuria, incomplete bladder emptying sensation.  Denies any flank pain, fevers, or chills.  Reports that she has not taken any over-the-counter medication for this issue.  She has increased her water intake, which has helped her voiding to be not \"as consistently painful\".  Patient states that symptoms would come and go, and she thought that they had resolved which is why she was not seen sooner.  She does have a history of 1 severe urinary tract infection and high school, but she does somewhat frequently have urinary tract infection symptoms, but they usually resolve on their own.  Denies any vaginal discharge, odor, itching.    Allergies: Patient has no known allergies.    Current Outpatient Medications Ordered in Epic   Medication Sig Dispense Refill   • nitrofurantoin (MACROBID) 100 MG Cap Take 1 Cap by mouth 2 times a day for 5 days. 10 Cap 0   • phenazopyridine (PYRIDIUM) 200 MG Tab Take 1 Tab by mouth 3 times a day as needed. 6 Tab 0   • TRI-LO-SPRINTEC 0.18/0.215/0.25 MG-25 MCG Tab Take 1 Tab by mouth every day. Take 1 tab PO daily 28 Tab 6     No current Epic-ordered facility-administered medications on file.      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     Past Surgical History:   Procedure Laterality Date   • MENISCUS REPAIR Right 2011     Social History     Tobacco Use   • Smoking status: Never Smoker   • Smokeless tobacco: Never Used   Substance Use Topics   • Alcohol use: Yes     Comment: socially   • " "Drug use: No     Social History     Social History Narrative   • Not on file     Family History   Problem Relation Age of Onset   • No Known Problems Mother    • No Known Problems Father    • Alcohol/Drug Sister         drugs   • Obesity Brother    • Diabetes Maternal Grandmother    • Cancer Maternal Grandfather         pancreatic cancer   • GI Disease Paternal Grandmother         Liver   • Arthritis Paternal Grandmother    • Other Paternal Grandmother         Chronic back pain   • No Known Problems Paternal Grandfather      ROS:   Constitutional: No Fevers, Chills  Eyes: No eye pain  ENT: No sore throat  Resp: No Shortness of breath  CV: No Chest pain  GI: No Nausea/Vomiting   : + Urinary urgency, frequency, dysuria, hematuria, incomplete bladder emptying sensation.  MSK: No weakness  Skin: No rashes  Neuro: No Headaches  Psych: No Suicidal ideations    All remaining systems reviewed and found to be negative, except as stated above.      Exam: /60 (BP Location: Left arm, Patient Position: Sitting, BP Cuff Size: Adult)   Pulse 83   Temp 36.5 °C (97.7 °F) (Temporal)   Resp 18   Ht 1.575 m (5' 2\")   Wt 69.9 kg (154 lb)   SpO2 96%  Body mass index is 28.17 kg/m².    General: Well nourished, well developed female in NAD, awake and conversant.  Eyes: Normal conjunctiva, anicteric.  Round symmetrical pupils.  ENT: Hearing grossly intact.  No nasal discharge.  Neck: Neck is supple.  No masses or thyromegaly.  CV: No lower extremity edema.  Respiratory: Respirations are nonlabored.  No wheezing.  Abdomen: Non-Distended.  Skin: Warm.  No rashes or ulcers.  MSK: Normal ambulation.  No clubbing or cyanosis.  Neuro: Sensation and CN II-XII grossly normal.  Psych: Alert and oriented.  Cooperative, appropriate mood and affect, normal judgment.    Assessment/Plan:  1. Acute cystitis with hematuria  Macrobid 100 mg twice daily for 5 days.  Pyridium as needed.  Urine to be sent for culture, will notify patient through " my chart of results.  - POCT Urinalysis  - nitrofurantoin (MACROBID) 100 MG Cap; Take 1 Cap by mouth 2 times a day for 5 days.  Dispense: 10 Cap; Refill: 0  - phenazopyridine (PYRIDIUM) 200 MG Tab; Take 1 Tab by mouth 3 times a day as needed.  Dispense: 6 Tab; Refill: 0  - URINE CULTURE(NEW); Future    2. Screening for STDs (sexually transmitted diseases)  - Chlamydia/GC PCR Urine Or Swab; Future    Educated in proper administration of medication(s) ordered today including safety, possible SE, risks, benefits, rationale and alternatives to therapy.   Supportive care, differential diagnoses, and indications for immediate follow-up discussed with patient.    Pathogenesis of diagnosis discussed including typical length and natural progression.    Instructed to return to clinic or nearest emergency department for any change in condition, further concerns, or worsening of symptoms.  Patient states understanding of the plan of care and discharge instructions.    Return if symptoms worsen or fail to improve.    Please note that this dictation was created using voice recognition software. I have made every reasonable attempt to correct obvious errors, but I expect that there are errors of grammar and possibly content that I did not discover before finalizing the note.

## 2020-03-05 NOTE — ASSESSMENT & PLAN NOTE
"New problem for the patient that started about 2 weeks ago.  Patient states that she had sudden onset of \"really bad symptoms\" including urinary urgency, urinary frequency, dysuria, hematuria, incomplete bladder emptying sensation.  Denies any flank pain, fevers, or chills.  Reports that she has not taken any over-the-counter medication for this issue.  She has increased her water intake, which has helped her voiding to be not \"as consistently painful\".  Patient states that symptoms would come and go, and she thought that they had resolved which is why she was not seen sooner.  She does have a history of 1 severe urinary tract infection and high school, but she does somewhat frequently have urinary tract infection symptoms, but they usually resolve on their own.  Denies any vaginal discharge, odor, itching.  "

## 2020-03-07 LAB
BACTERIA UR CULT: ABNORMAL
BACTERIA UR CULT: ABNORMAL
SIGNIFICANT IND 70042: ABNORMAL
SITE SITE: ABNORMAL
SOURCE SOURCE: ABNORMAL

## 2020-03-12 ENCOUNTER — OFFICE VISIT (OUTPATIENT)
Dept: URGENT CARE | Facility: PHYSICIAN GROUP | Age: 25
End: 2020-03-12
Payer: COMMERCIAL

## 2020-03-12 ENCOUNTER — HOSPITAL ENCOUNTER (OUTPATIENT)
Dept: RADIOLOGY | Facility: MEDICAL CENTER | Age: 25
End: 2020-03-12
Attending: PHYSICIAN ASSISTANT
Payer: COMMERCIAL

## 2020-03-12 VITALS
RESPIRATION RATE: 20 BRPM | HEIGHT: 62 IN | OXYGEN SATURATION: 96 % | HEART RATE: 82 BPM | BODY MASS INDEX: 28.19 KG/M2 | SYSTOLIC BLOOD PRESSURE: 118 MMHG | TEMPERATURE: 98 F | WEIGHT: 153.2 LBS | DIASTOLIC BLOOD PRESSURE: 70 MMHG

## 2020-03-12 DIAGNOSIS — S99.921A INJURY OF RIGHT FOOT, INITIAL ENCOUNTER: ICD-10-CM

## 2020-03-12 PROCEDURE — 73630 X-RAY EXAM OF FOOT: CPT | Mod: RT

## 2020-03-12 PROCEDURE — 99214 OFFICE O/P EST MOD 30 MIN: CPT | Performed by: PHYSICIAN ASSISTANT

## 2020-03-12 ASSESSMENT — ENCOUNTER SYMPTOMS
TINGLING: 0
JOINT SWELLING: 1
SENSORY CHANGE: 0
FALLS: 0

## 2020-03-13 NOTE — PROGRESS NOTES
"Subjective:      Karuna Shane is a 24 y.o. female who presents with Foot Pain (Pt states she dropped a weight on her (R) foot x1 week ago, states its tender to the touch. )            Patient is a 24-year-old female who presents to urgent care with right foot pain after dropping a 45 pound weight on her foot last week.  Patient reports notable swelling at that time along with slight bruising of which both of slightly improved however the pain is not.  She is utilize ice on the first day-however has not utilized anything since then.   She reports painful walking along with difficulty wearing normal shoes.     Foot Problem   This is a new problem. Episode onset: 7 days ago. The problem occurs constantly. The problem has been unchanged. Associated symptoms include joint swelling. Exacerbated by: walking, pressure over the foot.  She has tried ice for the symptoms. The treatment provided mild relief.   Currently on menses.     Review of Systems   Musculoskeletal: Positive for joint pain and joint swelling. Negative for falls.   Neurological: Negative for tingling and sensory change.   All other systems reviewed and are negative.         Objective:     /70 (BP Location: Left arm, Patient Position: Sitting, BP Cuff Size: Adult)   Pulse 82   Temp 36.7 °C (98 °F) (Temporal)   Resp 20   Ht 1.575 m (5' 2\")   Wt 69.5 kg (153 lb 3.2 oz)   LMP 02/13/2020 (Approximate)   SpO2 96%   BMI 28.02 kg/m²    PMH:  has a past medical history of Band, amniotic (1994).  MEDS: Reviewed .   ALLERGIES: No Known Allergies  SURGHX:   Past Surgical History:   Procedure Laterality Date   • MENISCUS REPAIR Right 2011     SOCHX:  reports that she has never smoked. She has never used smokeless tobacco. She reports current alcohol use. She reports that she does not use drugs.  FH: Family history was reviewed, no pertinent findings to report    Physical Exam  Vitals signs reviewed.   Constitutional:       General: She is not in " acute distress.     Appearance: She is well-developed.   HENT:      Head: Normocephalic and atraumatic.   Eyes:      Conjunctiva/sclera: Conjunctivae normal.      Pupils: Pupils are equal, round, and reactive to light.   Neck:      Musculoskeletal: Normal range of motion and neck supple.      Trachea: No tracheal deviation.   Cardiovascular:      Rate and Rhythm: Normal rate.   Pulmonary:      Effort: Pulmonary effort is normal.   Musculoskeletal:         General: Tenderness present.      Right foot: Decreased range of motion. Tenderness, bony tenderness and swelling present.        Feet:    Skin:     General: Skin is warm.      Findings: No rash.   Neurological:      Mental Status: She is alert and oriented to person, place, and time.      Gait: Gait abnormal.   Psychiatric:         Behavior: Behavior normal.         Thought Content: Thought content normal.         Judgment: Judgment normal.               Xr foot:  No radiographic evidence of acute displaced fracture or subluxation.  Mild dorsum soft tissue swelling  Minimal first metatarsal-phalangeal joint arthritis    Assessment/Plan:       1. Injury of right foot, initial encounter  - DX-FOOT-COMPLETE 3+ RIGHT; Future    Pt. Is to start NSAIDS, and ice, with elevation- also footwear further discussed.   RTC in 1 week if minimal improvement.

## 2020-06-15 RX ORDER — NORGESTIMATE AND ETHINYL ESTRADIOL
1 KIT DAILY
Qty: 28 TAB | Refills: 0 | Status: SHIPPED | OUTPATIENT
Start: 2020-06-15 | End: 2020-07-02

## 2020-06-15 NOTE — TELEPHONE ENCOUNTER
Received request via: Pharmacy    Was the patient seen in the last year in this department? Yes LOV 03/05/2020    Does the patient have an active prescription (recently filled or refills available) for medication(s) requested? No

## 2020-07-02 ENCOUNTER — OFFICE VISIT (OUTPATIENT)
Dept: MEDICAL GROUP | Facility: PHYSICIAN GROUP | Age: 25
End: 2020-07-02
Payer: COMMERCIAL

## 2020-07-02 ENCOUNTER — HOSPITAL ENCOUNTER (OUTPATIENT)
Dept: RADIOLOGY | Facility: MEDICAL CENTER | Age: 25
End: 2020-07-02
Attending: NURSE PRACTITIONER
Payer: COMMERCIAL

## 2020-07-02 VITALS
DIASTOLIC BLOOD PRESSURE: 70 MMHG | SYSTOLIC BLOOD PRESSURE: 116 MMHG | HEIGHT: 62 IN | WEIGHT: 158 LBS | BODY MASS INDEX: 29.08 KG/M2 | RESPIRATION RATE: 16 BRPM | TEMPERATURE: 97.9 F | OXYGEN SATURATION: 96 % | HEART RATE: 90 BPM

## 2020-07-02 DIAGNOSIS — R68.84 JAW PAIN: ICD-10-CM

## 2020-07-02 DIAGNOSIS — Z30.09 BIRTH CONTROL COUNSELING: ICD-10-CM

## 2020-07-02 PROCEDURE — 99214 OFFICE O/P EST MOD 30 MIN: CPT | Performed by: NURSE PRACTITIONER

## 2020-07-02 PROCEDURE — 70110 X-RAY EXAM OF JAW 4/> VIEWS: CPT

## 2020-07-02 RX ORDER — METHYLPREDNISOLONE 4 MG/1
TABLET ORAL
Qty: 21 TAB | Refills: 0 | Status: SHIPPED | OUTPATIENT
Start: 2020-07-02 | End: 2020-11-11

## 2020-07-02 NOTE — PROGRESS NOTES
"CC:   Chief Complaint   Patient presents with   • Jaw Pain     right sided x's 2 weeks     HISTORY OF THE PRESENT ILLNESS: Patient is a 24 y.o. female. This pleasant patient is here today to discuss right-sided jaw pain for 2 weeks.    Health Maintenance: Completed    Jaw pain  New to this examiner.  It has been going on for 2 weeks.  At first she noticed it was just sore and then it has slowly progressed to a sharp pain.  She states the sharp pain is even when she tries to just put her teeth together.  She is unable to chew food on that side of her mouth.  She denies any triggers before hand.  She denies ear pain or tooth pain, has no history of grinding her teeth.  She denies hearing a clicking or popping noise when she bites down.  She does states she hears a little \"noise\" when she opens her mouth.  Denies being to the dentist recently.    Birth control counseling  New to this examiner.  Previously was on birth control but admits to getting lazy and forgetting to take the pill.  It messed with her monthly period, so she decided just to stop taking the pill.  She would like to discuss different birth control options at this time.    Allergies: Patient has no known allergies.    Current Outpatient Medications Ordered in Epic   Medication Sig Dispense Refill   • methylPREDNISolone (MEDROL DOSEPAK) 4 MG Tablet Therapy Pack As directed on the packaging label. 21 Tab 0     No current Epic-ordered facility-administered medications on file.      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     Past Surgical History:   Procedure Laterality Date   • MENISCUS REPAIR Right 2011     Social History     Tobacco Use   • Smoking status: Never Smoker   • Smokeless tobacco: Never Used   Substance Use Topics   • Alcohol use: Yes     Comment: socially   • Drug use: No     Social History     Social History Narrative   • Not on file     Family History   Problem Relation Age " "of Onset   • No Known Problems Mother    • No Known Problems Father    • Alcohol/Drug Sister         drugs   • Obesity Brother    • Diabetes Maternal Grandmother    • Cancer Maternal Grandfather         pancreatic cancer   • GI Disease Paternal Grandmother         Liver   • Arthritis Paternal Grandmother    • Other Paternal Grandmother         Chronic back pain   • No Known Problems Paternal Grandfather      ROS:   Constitutional: No fevers, chills, malaise/fatigue.  Eyes: No eye pain.  ENT: Positive jaw pain. No sore throat, congestion.   Resp: No cough, shortness of breath.  CV: No chest pain, leg swelling, palpitations.  GI: No nausea/vomiting, abdominal pain, constipation, diarrhea.  : No dysuria, hematuria.  MSK: No weakness.  Skin: No rashes.  Neuro: No dizziness, weakness, headaches.  Psych: No suicidal ideations.    All remaining systems reviewed and found to be negative, except as stated above.      Exam: /70 (BP Location: Left arm, Patient Position: Sitting)   Pulse 90   Temp 36.6 °C (97.9 °F)   Resp 16   Ht 1.575 m (5' 2\")   Wt 71.7 kg (158 lb)   SpO2 96%  Body mass index is 28.9 kg/m².    General: Well nourished, well developed female in NAD, awake and conversant.  Eyes: Normal conjunctiva, anicteric.  Round symmetrical pupils.  ENT: Hearing grossly intact.  No nasal discharge.  Neck: Neck is supple.  No masses or thyromegaly.  CV: No lower extremity edema.  Respiratory: Respirations are nonlabored.  No wheezing.  Abdomen: Non-Distended.  Skin: Warm.  No rashes or ulcers.  MSK: Normal ambulation.  No clubbing or cyanosis.  Neuro: Sensation and CN II-XII grossly normal.  Psych: Alert and oriented.  Cooperative, appropriate mood and affect, normal judgment.    Assessment/Plan:  1. Jaw pain  Acute and ongoing  Educated patient that we will get an x-ray of her jaw.  Educated on the use of a Medrol Dosepak as this may help with any inflammation that may be causing her pain.  Educated her to " follow-up with a dentist.  - methylPREDNISolone (MEDROL DOSEPAK) 4 MG Tablet Therapy Pack; As directed on the packaging label.  Dispense: 21 Tab; Refill: 0  - DX-MANDIBLE-COMPLETE - BILATERAL 4+; Future    2. Birth control counseling  Patient was interested in a birth control that she does not have to take every day.  Discussed multiple options.  Gave patient educational material on the Depo shot and other options.  Patient will call to make an appointment if she decides to pursue birth control.    Educated in proper administration of medication(s) ordered today including safety, possible SE, risks, benefits, rationale and alternatives to therapy.   Supportive care, differential diagnoses, and indications for immediate follow-up discussed with patient.    Pathogenesis of diagnosis discussed including typical length and natural progression.    Instructed to return to clinic or nearest emergency department for any change in condition, further concerns, or worsening of symptoms.  Patient states understanding of the plan of care and discharge instructions.    Return if symptoms worsen or fail to improve.    Please note that this dictation was created using voice recognition software. I have made every reasonable attempt to correct obvious errors, but I expect that there are errors of grammar and possibly content that I did not discover before finalizing the note.

## 2020-07-02 NOTE — PATIENT INSTRUCTIONS
Contraceptive Injection  A contraceptive injection is a shot that prevents pregnancy. It is also called the birth control shot. The shot contains the hormone progestin, which prevents pregnancy by:  · Stopping the ovaries from releasing eggs.  · Thickening cervical mucus to prevent sperm from entering the cervix.  · Thinning the lining of the uterus to prevent a fertilized egg from attaching to the uterus.  Contraceptive injections are given under the skin (subcutaneous) or into a muscle (intramuscular). For these shots to work, you must get one of them every 3 months (12 weeks) from a health care provider.  Tell a health care provider about:  · Any allergies you have.  · All medicines you are taking, including vitamins, herbs, eye drops, creams, and over-the-counter medicines.  · Any blood disorders you have.  · Any medical conditions you have.  · Whether you are pregnant or may be pregnant.  What are the risks?  Generally, this is a safe procedure. However, problems may occur, including:  · Mood changes or depression.  · Loss of bone density (osteoporosis) after long-term use.  · Blood clots.  · Higher risk of an egg being fertilized outside your uterus (ectopic pregnancy).This is rare.  What happens before the procedure?  · Your health care provider may do a routine physical exam.  · You may have a test to make sure you are not pregnant.  What happens during the procedure?  · The area where the shot will be given will be cleaned and sanitized with alcohol.  · A needle will be inserted into a muscle in your upper arm or buttock, or into the skin of your thigh or abdomen. The needle will be attached to a syringe with the medicine inside of it.  · The medicine will be pushed through the syringe and injected into your body.  · A small bandage (dressing) may be placed over the injection site.  What can I expect after the procedure?  · After the procedure, it is common to have:  ? Soreness around the injection site for  a couple of days.  ? Irregular menstrual bleeding.  ? Weight gain.  ? Breast tenderness.  ? Headaches.  ? Discomfort in your abdomen.  · Ask your health care provider whether you need to use an added method of birth control (backup contraception), such as a condom, sponge, or spermicide.  ? If the first shot is given 1-7 days after the start of your last period, you will not need backup contraception.  ? If the first shot is given at any other time during your menstrual cycle, you should avoid having sex or you will need backup contraception for 7 days after you receive the shot.  Follow these instructions at home:  General instructions    · Take over-the-counter and prescription medicines only as told by your health care provider.  · Do not massage the injection site.  · Track your menstrual periods so you will know if they become irregular.  · Always use a condom to protect against STIs (sexually transmitted infections).  · Make sure you schedule an appointment in time for your next shot, and jojo it on your calendar. For the birth control to prevent pregnancy, you must get the injections every 3 months (12 weeks).  Lifestyle  · Do not use any products that contain nicotine or tobacco, such as cigarettes and e-cigarettes. If you need help quitting, ask your health care provider.  · Eat foods that are high in calcium and vitamin D, such as milk, cheese, and salmon. Doing this may help with any loss in bone density that is caused by the contraceptive injection. Ask your health care provider for dietary recommendations.  Contact a health care provider if:  · You have nausea or vomiting.  · You have abnormal vaginal discharge or bleeding.  · You miss a period or you think you might be pregnant.  · You experience mood changes or depression.  · You feel dizzy or light-headed.  · You have leg pain.  Get help right away if:  · You have chest pain.  · You cough up blood.  · You have shortness of breath.  · You have a  severe headache that does not go away.  · You have numbness in any part of your body.  · You have slurred speech.  · You have vision problems.  · You have vaginal bleeding that is abnormally heavy or does not stop.  · You have severe pain in your abdomen.  · You have depression that does not get better with treatment.  If you ever feel like you may hurt yourself or others, or have thoughts about taking your own life, get help right away. You can go to your nearest emergency department or call:  · Your local emergency services (911 in the [place]  · A suicide crisis helpline, such as the National Suicide Prevention Lifeline at 1-818.605.6236. This is open 24 hours a day.  Summary  · A contraceptive injection is a shot that prevents pregnancy. It is also called the birth control shot.  · The shot is given under the skin (subcutaneous) or into a muscle (intramuscular).  · After this procedure, it is common to have soreness around the injection site for a couple of days.  · To prevent pregnancy, the shot must be given by a health care provider every 3 months (12 weeks).  · After you have the shot, ask your health care provider whether you need to use an added method of birth control (backup contraception), such as a condom, sponge, or spermicide.  This information is not intended to replace advice given to you by your health care provider. Make sure you discuss any questions you have with your health care provider.  Document Released: 08/13/2018 Document Revised: 11/30/2018 Document Reviewed: 08/13/2018  [employer] Patient Education © 2020 [employer]  Medroxyprogesterone injection [Contraceptive]  What is this medicine?  MEDROXYPROGESTERONE (me DROX ee proe BELLE te bob) contraceptive injections prevent pregnancy. They provide effective birth control for 3 months. Depo-subQ Provera 104 is also used for treating pain related to endometriosis.  This medicine may be used for other purposes; ask your health care provider or  pharmacist if you have questions.  COMMON BRAND NAME(S): Depo-Provera, Depo-subQ Provera 104  What should I tell my health care provider before I take this medicine?  They need to know if you have any of these conditions:  · frequently drink alcohol  · asthma  · blood vessel disease or a history of a blood clot in the lungs or legs  · bone disease such as osteoporosis  · breast cancer  · diabetes  · eating disorder (anorexia nervosa or bulimia)  · high blood pressure  · HIV infection or AIDS  · kidney disease  · liver disease  · mental depression  · migraine  · seizures (convulsions)  · stroke  · tobacco smoker  · vaginal bleeding  · an unusual or allergic reaction to medroxyprogesterone, other hormones, medicines, foods, dyes, or preservatives  · pregnant or trying to get pregnant  · breast-feeding  How should I use this medicine?  Depo-Provera Contraceptive injection is given into a muscle. Depo-subQ Provera 104 injection is given under the skin. These injections are given by a health care professional. You must not be pregnant before getting an injection. The injection is usually given during the first 5 days after the start of a menstrual period or 6 weeks after delivery of a baby.  Talk to your pediatrician regarding the use of this medicine in children. Special care may be needed. These injections have been used in female children who have started having menstrual periods.  Overdosage: If you think you have taken too much of this medicine contact a poison control center or emergency room at once.  NOTE: This medicine is only for you. Do not share this medicine with others.  What if I miss a dose?  Try not to miss a dose. You must get an injection once every 3 months to maintain birth control. If you cannot keep an appointment, call and reschedule it. If you wait longer than 13 weeks between Depo-Provera contraceptive injections or longer than 14 weeks between Depo-subQ Provera 104 injections, you could get  pregnant. Use another method for birth control if you miss your appointment. You may also need a pregnancy test before receiving another injection.  What may interact with this medicine?  Do not take this medicine with any of the following medications:  · bosentan  This medicine may also interact with the following medications:  · aminoglutethimide  · antibiotics or medicines for infections, especially rifampin, rifabutin, rifapentine, and griseofulvin  · aprepitant  · barbiturate medicines such as phenobarbital or primidone  · bexarotene  · carbamazepine  · medicines for seizures like ethotoin, felbamate, oxcarbazepine, phenytoin, topiramate  · modafinil  · Hakeem's wort  This list may not describe all possible interactions. Give your health care provider a list of all the medicines, herbs, non-prescription drugs, or dietary supplements you use. Also tell them if you smoke, drink alcohol, or use illegal drugs. Some items may interact with your medicine.  What should I watch for while using this medicine?  This drug does not protect you against HIV infection (AIDS) or other sexually transmitted diseases.  Use of this product may cause you to lose calcium from your bones. Loss of calcium may cause weak bones (osteoporosis). Only use this product for more than 2 years if other forms of birth control are not right for you. The longer you use this product for birth control the more likely you will be at risk for weak bones. Ask your health care professional how you can keep strong bones.  You may have a change in bleeding pattern or irregular periods. Many females stop having periods while taking this drug.  If you have received your injections on time, your chance of being pregnant is very low. If you think you may be pregnant, see your health care professional as soon as possible.  Tell your health care professional if you want to get pregnant within the next year. The effect of this medicine may last a long time  after you get your last injection.  What side effects may I notice from receiving this medicine?  Side effects that you should report to your doctor or health care professional as soon as possible:  · allergic reactions like skin rash, itching or hives, swelling of the face, lips, or tongue  · breast tenderness or discharge  · breathing problems  · changes in vision  · depression  · feeling faint or lightheaded, falls  · fever  · pain in the abdomen, chest, groin, or leg  · problems with balance, talking, walking  · unusually weak or tired  · yellowing of the eyes or skin  Side effects that usually do not require medical attention (report to your doctor or health care professional if they continue or are bothersome):  · acne  · fluid retention and swelling  · headache  · irregular periods, spotting, or absent periods  · temporary pain, itching, or skin reaction at site where injected  · weight gain  This list may not describe all possible side effects. Call your doctor for medical advice about side effects. You may report side effects to FDA at 6-313-RWI-6660.  Where should I keep my medicine?  This does not apply. The injection will be given to you by a health care professional.  NOTE: This sheet is a summary. It may not cover all possible information. If you have questions about this medicine, talk to your doctor, pharmacist, or health care provider.  © 2020 [employer]/Gold Standard (2010-01-08 18:37:56)    Contraception Choices  Contraception, also called birth control, means things to use or ways to try not to get pregnant.  Hormonal birth control  This kind of birth control uses hormones. Here are some types of hormonal birth control:  · A tube that is put under skin of the arm (implant). The tube can stay in for as long as 3 years.  · Shots to get every 3 months (injections).  · Pills to take every day (birth control pills).  · A patch to change 1 time each week for 3 weeks (birth control patch). After that, the  patch is taken off for 1 week.  · A ring to put in the vagina. The ring is left in for 3 weeks. Then it is taken out of the vagina for 1 week. Then a new ring is put in.  · Pills to take after unprotected sex (emergency birth control pills).  Barrier birth control  Here are some types of barrier birth control:  · A thin covering that is put on the penis before sex (male condom). The covering is thrown away after sex.  · A soft, loose covering that is put in the vagina before sex (female condom). The covering is thrown away after sex.  · A rubber bowl that sits over the cervix (diaphragm). The bowl must be made for you. The bowl is put into the vagina before sex. The bowl is left in for 6-8 hours after sex. It is taken out within 24 hours.  · A small, soft cup that fits over the cervix (cervical cap). The cup must be made for you. The cup can be left in for 6-8 hours after sex. It is taken out within 48 hours.  · A sponge that is put into the vagina before sex. It must be left in for at least 6 hours after sex. It must be taken out within 30 hours. Then it is thrown away.  · A chemical that kills or stops sperm from getting into the uterus (spermicide). It may be a pill, cream, jelly, or foam to put in the vagina. The chemical should be used at least 10-15 minutes before sex.  IUD (intrauterine) birth control  An IUD is a small, T-shaped piece of plastic. It is put inside the uterus. There are two kinds:  · Hormone IUD. This kind can stay in for 3-5 years.  · Copper IUD. This kind can stay in for 10 years.  Permanent birth control  Here are some types of permanent birth control:  · Surgery to block the fallopian tubes.  · Having an insert put into each fallopian tube.  · Surgery to tie off the tubes that carry sperm (vasectomy).  Natural planning birth control  Here are some types of natural planning birth control:  · Not having sex on the days the woman could get pregnant.  · Using a calendar:  ? To keep track of  the length of each period.  ? To find out what days pregnancy can happen.  ? To plan to not have sex on days when pregnancy can happen.  · Watching for symptoms of ovulation and not having sex during ovulation. One way the woman can check for ovulation is to check her temperature.  · Waiting to have sex until after ovulation.  Summary  · Contraception, also called birth control, means things to use or ways to try not to get pregnant.  · Hormonal methods of birth control include implants, injections, pills, patches, vaginal rings, and emergency birth control pills.  · Barrier methods of birth control can include male condoms, female condoms, diaphragms, cervical caps, sponges, and spermicides.  · There are two types of IUD (intrauterine device) birth control. An IUD can be put in a woman's uterus to prevent pregnancy for 3-5 years.  · Permanent sterilization can be done through a procedure for males, females, or both.  · Natural planning methods involve not having sex on the days when the woman could get pregnant.  This information is not intended to replace advice given to you by your health care provider. Make sure you discuss any questions you have with your health care provider.  Document Released: 10/15/2010 Document Revised: 04/08/2020 Document Reviewed: 12/28/2017  [employer] Patient Education © 2020 [employer]

## 2020-07-02 NOTE — ASSESSMENT & PLAN NOTE
"New to this examiner.  It has been going on for 2 weeks.  At first she noticed it was just sore and then it has slowly progressed to a sharp pain.  She states the sharp pain is even when she tries to just put her teeth together.  She is unable to chew food on that side of her mouth.  She denies any triggers before hand.  She denies ear pain or tooth pain, has no history of grinding her teeth.  She denies hearing a clicking or popping noise when she bites down.  She does states she hears a little \"noise\" when she opens her mouth.  Denies being to the dentist recently.  "

## 2020-07-02 NOTE — ASSESSMENT & PLAN NOTE
New to this examiner.  Previously was on birth control but admits to getting lazy and forgetting to take the pill.  It messed with her monthly period, so she decided just to stop taking the pill.  She would like to discuss different birth control options at this time.

## 2020-09-11 DIAGNOSIS — Z30.09 BIRTH CONTROL COUNSELING: ICD-10-CM

## 2020-09-11 NOTE — TELEPHONE ENCOUNTER
Received request via: Patient Pt decided to restart her Birth Control    Was the patient seen in the last year in this department? Yes    Does the patient have an active prescription (recently filled or refills available) for medication(s) requested? No

## 2020-09-14 RX ORDER — NORGESTIMATE AND ETHINYL ESTRADIOL
1 KIT DAILY
Qty: 28 TAB | Refills: 0 | OUTPATIENT
Start: 2020-09-14

## 2020-09-17 ENCOUNTER — TELEPHONE (OUTPATIENT)
Dept: MEDICAL GROUP | Facility: PHYSICIAN GROUP | Age: 25
End: 2020-09-17

## 2020-09-17 ENCOUNTER — NON-PROVIDER VISIT (OUTPATIENT)
Dept: MEDICAL GROUP | Facility: PHYSICIAN GROUP | Age: 25
End: 2020-09-17
Payer: COMMERCIAL

## 2020-09-17 ENCOUNTER — TELEPHONE (OUTPATIENT)
Dept: URGENT CARE | Facility: PHYSICIAN GROUP | Age: 25
End: 2020-09-17

## 2020-09-17 DIAGNOSIS — Z32.00 ENCOUNTER FOR PREGNANCY TEST, RESULT UNKNOWN: ICD-10-CM

## 2020-09-17 LAB
INT CON NEG: POSITIVE
INT CON POS: NEGATIVE
POC URINE PREGNANCY TEST: NORMAL

## 2020-09-17 PROCEDURE — 81025 URINE PREGNANCY TEST: CPT | Performed by: NURSE PRACTITIONER

## 2020-09-17 RX ORDER — NORGESTIMATE AND ETHINYL ESTRADIOL
1 KIT DAILY
Qty: 84 TAB | Refills: 3 | Status: SHIPPED | OUTPATIENT
Start: 2020-09-17 | End: 2021-01-30 | Stop reason: SDUPTHER

## 2020-09-17 NOTE — NON-PROVIDER
Karuna Shane is a 24 y.o. female here for a non-provider visit for Pregnancy Test prior to BC Order    If abnormal was an in office provider notified today (if so, indicate provider)? No  Routed to PCP? No

## 2020-09-17 NOTE — PROGRESS NOTES
Requested Prescriptions     Signed Prescriptions Disp Refills   • TRI-LO-SPRINTEC 0.18/0.215/0.25 MG-25 MCG Tab 84 Tab 3     Sig: Take 1 Tab by mouth every day.       DENNIS Ames.P.R.N.    POCT pregnancy test negative in clinic

## 2020-09-17 NOTE — TELEPHONE ENCOUNTER
Pt called requesting refill for birth control. I saw in her chart that Ines is requesting an MA visit for a urine sample before refilling since she has been off for a few months. Pt is coming in today at 3:15pm for urine sample as she is anticipating to start her birth control as soon as possible.

## 2020-09-18 NOTE — TELEPHONE ENCOUNTER
Received request via: Pharmacy    Was the patient seen in the last year in this department? Yes    Does the patient have an active prescription (recently filled or refills available) for medication(s) requested? No      Last OV 07/02/2020

## 2020-11-11 ENCOUNTER — HOSPITAL ENCOUNTER (OUTPATIENT)
Facility: MEDICAL CENTER | Age: 25
End: 2020-11-11
Attending: FAMILY MEDICINE
Payer: COMMERCIAL

## 2020-11-11 ENCOUNTER — OFFICE VISIT (OUTPATIENT)
Dept: URGENT CARE | Facility: PHYSICIAN GROUP | Age: 25
End: 2020-11-11
Payer: COMMERCIAL

## 2020-11-11 VITALS
WEIGHT: 155 LBS | TEMPERATURE: 98 F | SYSTOLIC BLOOD PRESSURE: 102 MMHG | HEART RATE: 84 BPM | BODY MASS INDEX: 28.52 KG/M2 | OXYGEN SATURATION: 97 % | DIASTOLIC BLOOD PRESSURE: 66 MMHG | HEIGHT: 62 IN | RESPIRATION RATE: 16 BRPM

## 2020-11-11 DIAGNOSIS — N39.0 RECURRENT UTI: ICD-10-CM

## 2020-11-11 LAB
APPEARANCE UR: NORMAL
BILIRUB UR STRIP-MCNC: NEGATIVE MG/DL
COLOR UR AUTO: YELLOW
GLUCOSE UR STRIP.AUTO-MCNC: NEGATIVE MG/DL
INT CON NEG: NEGATIVE
INT CON POS: POSITIVE
KETONES UR STRIP.AUTO-MCNC: NEGATIVE MG/DL
LEUKOCYTE ESTERASE UR QL STRIP.AUTO: NORMAL
NITRITE UR QL STRIP.AUTO: NEGATIVE
PH UR STRIP.AUTO: 5.5 [PH] (ref 5–8)
POC URINE PREGNANCY TEST: NEGATIVE
PROT UR QL STRIP: NORMAL MG/DL
RBC UR QL AUTO: NORMAL
SP GR UR STRIP.AUTO: 1.02
UROBILINOGEN UR STRIP-MCNC: NORMAL MG/DL

## 2020-11-11 PROCEDURE — 81025 URINE PREGNANCY TEST: CPT | Performed by: FAMILY MEDICINE

## 2020-11-11 PROCEDURE — 99214 OFFICE O/P EST MOD 30 MIN: CPT | Performed by: FAMILY MEDICINE

## 2020-11-11 PROCEDURE — 87086 URINE CULTURE/COLONY COUNT: CPT

## 2020-11-11 PROCEDURE — 87077 CULTURE AEROBIC IDENTIFY: CPT

## 2020-11-11 PROCEDURE — 81002 URINALYSIS NONAUTO W/O SCOPE: CPT | Performed by: FAMILY MEDICINE

## 2020-11-11 RX ORDER — NITROFURANTOIN 25; 75 MG/1; MG/1
CAPSULE ORAL
COMMUNITY
Start: 2020-10-23 | End: 2020-11-17

## 2020-11-11 ASSESSMENT — ENCOUNTER SYMPTOMS
NAUSEA: 0
MYALGIAS: 0
WEIGHT LOSS: 0
EYE DISCHARGE: 0
EYE REDNESS: 0
VOMITING: 0

## 2020-11-11 NOTE — PROGRESS NOTES
"Subjective:      Karuna Shane is a 24 y.o. female who presents with Painful Urination (Painful urination, frequent urination x this morning)            Onset this morning urinary burning urgency and frequency.  No hematuria.  No fever or flank pain.  No PMH pyelonephritis.  No OTC medications.  Symptoms are moderate severity and progressively worse.  No other aggravating or alleviating factors.      Review of Systems   Constitutional: Negative for malaise/fatigue and weight loss.   Eyes: Negative for discharge and redness.   Gastrointestinal: Negative for nausea and vomiting.   Musculoskeletal: Negative for joint pain and myalgias.   Skin: Negative for itching and rash.   .  Medications, Allergies, and current problem list reviewed today in Epic  PMH recurrent UTI, approx 4 yearly since approx 11y/o.        Objective:     /66 (BP Location: Left arm, Patient Position: Sitting, BP Cuff Size: Adult)   Pulse 84   Temp 36.7 °C (98 °F) (Temporal)   Resp 16   Ht 1.575 m (5' 2\")   Wt 70.3 kg (155 lb)   SpO2 97%   BMI 28.35 kg/m²      Physical Exam  Constitutional:       General: She is not in acute distress.     Appearance: She is well-developed.   HENT:      Head: Normocephalic and atraumatic.   Eyes:      Conjunctiva/sclera: Conjunctivae normal.   Cardiovascular:      Rate and Rhythm: Normal rate and regular rhythm.      Heart sounds: Normal heart sounds. No murmur.   Pulmonary:      Effort: Pulmonary effort is normal.      Breath sounds: Normal breath sounds. No wheezing.   Genitourinary:     Comments: Suprapubic tenderness, no CVAT  Skin:     General: Skin is warm and dry.      Findings: No rash.   Neurological:      Mental Status: She is alert and oriented to person, place, and time.                 Assessment/Plan:      UA reviewed  Pregnancy negative    1. Recurrent UTI  POCT Urinalysis    POCT PREGNANCY    REFERRAL TO UROLOGY    URINE CULTURE(NEW)     Differential diagnosis, natural history, " supportive care, and indications for immediate follow-up discussed at length.     Push fluid  F/u culture  Referral placed

## 2020-11-12 DIAGNOSIS — N39.0 RECURRENT UTI: ICD-10-CM

## 2020-11-17 ENCOUNTER — APPOINTMENT (OUTPATIENT)
Dept: MEDICAL GROUP | Facility: PHYSICIAN GROUP | Age: 25
End: 2020-11-17
Payer: COMMERCIAL

## 2020-11-17 ENCOUNTER — OFFICE VISIT (OUTPATIENT)
Dept: MEDICAL GROUP | Facility: PHYSICIAN GROUP | Age: 25
End: 2020-11-17
Payer: COMMERCIAL

## 2020-11-17 ENCOUNTER — HOSPITAL ENCOUNTER (OUTPATIENT)
Facility: MEDICAL CENTER | Age: 25
End: 2020-11-17
Attending: NURSE PRACTITIONER
Payer: COMMERCIAL

## 2020-11-17 VITALS
RESPIRATION RATE: 18 BRPM | HEIGHT: 62 IN | HEART RATE: 100 BPM | BODY MASS INDEX: 29.26 KG/M2 | DIASTOLIC BLOOD PRESSURE: 62 MMHG | SYSTOLIC BLOOD PRESSURE: 100 MMHG | TEMPERATURE: 99.5 F | OXYGEN SATURATION: 95 % | WEIGHT: 159 LBS

## 2020-11-17 DIAGNOSIS — N39.0 FREQUENT UTI: ICD-10-CM

## 2020-11-17 DIAGNOSIS — A49.1 GROUP B STREPTOCOCCAL INFECTION: ICD-10-CM

## 2020-11-17 DIAGNOSIS — R30.0 DYSURIA: ICD-10-CM

## 2020-11-17 DIAGNOSIS — R87.610 ATYPICAL SQUAMOUS CELLS OF UNDETERMINED SIGNIFICANCE (ASCUS) ON PAPANICOLAOU SMEAR OF CERVIX: ICD-10-CM

## 2020-11-17 LAB
APPEARANCE UR: NORMAL
BILIRUB UR STRIP-MCNC: NEGATIVE MG/DL
COLOR UR AUTO: YELLOW
GLUCOSE UR STRIP.AUTO-MCNC: NEGATIVE MG/DL
KETONES UR STRIP.AUTO-MCNC: NORMAL MG/DL
LEUKOCYTE ESTERASE UR QL STRIP.AUTO: NEGATIVE
NITRITE UR QL STRIP.AUTO: NEGATIVE
PH UR STRIP.AUTO: 5.5 [PH] (ref 5–8)
PROT UR QL STRIP: NEGATIVE MG/DL
RBC UR QL AUTO: NEGATIVE
SP GR UR STRIP.AUTO: 1.01
UROBILINOGEN UR STRIP-MCNC: 0.2 MG/DL

## 2020-11-17 PROCEDURE — 99214 OFFICE O/P EST MOD 30 MIN: CPT | Performed by: NURSE PRACTITIONER

## 2020-11-17 PROCEDURE — 87077 CULTURE AEROBIC IDENTIFY: CPT

## 2020-11-17 PROCEDURE — 87086 URINE CULTURE/COLONY COUNT: CPT

## 2020-11-17 PROCEDURE — 81002 URINALYSIS NONAUTO W/O SCOPE: CPT | Performed by: NURSE PRACTITIONER

## 2020-11-17 RX ORDER — AMOXICILLIN 875 MG/1
875 TABLET, COATED ORAL 2 TIMES DAILY
Qty: 14 TAB | Refills: 0 | Status: SHIPPED | OUTPATIENT
Start: 2020-11-17 | End: 2020-11-24

## 2020-11-17 NOTE — PROGRESS NOTES
"CC:   Chief Complaint   Patient presents with   • Vaginitis     x 1 week, seen and had culture done in      HISTORY OF THE PRESENT ILLNESS: Patient is a 24 y.o. female. This pleasant patient is here today to discuss UTI symptoms since 2020, was seen in UC and did not received antibiotic prescription.     Health Maintenance: Completed    Dysuria  Recurrent problem for the patient.  Patient was seen in urgent care on 2020 for dysuria and urinary frequency.  Urinalysis and urine culture was obtained.  Patient states that before leaving urgent care she was told to start antibiotics while waiting for culture results, states that she went to her pharmacy and antibiotics were never sent.  She called the urgent care to follow-up on the prescription and was told that it would be sent, she returned to her pharmacy and there was still no prescription for an antibiotic.  The patient states that she is going out of town the following weekend, so she called again to the urgent care on Friday to discuss antibiotic prescription, states she was transferred twice and finally ended at a voicemail so she \"gave up\".  Reports that initial symptoms had improved since her urgent care visit, but her urine continues to be cloudy.  Urine culture results received on 11/15/2020, message from urgent care provider advised her to \"stop the antibiotics and follow-up with primary care and urology\".  She was given a referral to urology, which has not been assigned.  Patient states that she was also told she had a group B strep infection which can cause problems with the  and she should advise her OB of the positive bacteria on urine culture if she becomes pregnant.  Patient states that the message was confusing and she is unclear if this will prevent her from having pregnancies in the future.  Patient reports that she is frustrated with her urgent care experience and the poor follow-up.    Allergies: Patient has no known " "allergies.    Current Outpatient Medications Ordered in Epic   Medication Sig Dispense Refill   • amoxicillin (AMOXIL) 875 MG tablet Take 1 Tab by mouth 2 times a day for 7 days. 14 Tab 0   • TRI-LO-SPRINTEC 0.18/0.215/0.25 MG-25 MCG Tab Take 1 Tab by mouth every day. 84 Tab 3     No current Epic-ordered facility-administered medications on file.      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     Past Surgical History:   Procedure Laterality Date   • MENISCUS REPAIR Right 2011     Social History     Tobacco Use   • Smoking status: Never Smoker   • Smokeless tobacco: Never Used   Substance Use Topics   • Alcohol use: Yes     Comment: socially   • Drug use: No     Social History     Social History Narrative   • Not on file     Family History   Problem Relation Age of Onset   • No Known Problems Mother    • No Known Problems Father    • Alcohol/Drug Sister         drugs   • Obesity Brother    • Diabetes Maternal Grandmother    • Cancer Maternal Grandfather         pancreatic cancer   • GI Disease Paternal Grandmother         Liver   • Arthritis Paternal Grandmother    • Other Paternal Grandmother         Chronic back pain   • No Known Problems Paternal Grandfather      ROS:   Constitutional: No Fevers, Chills  Eyes: No eye pain  ENT: No sore throat  Resp: No Shortness of breath  CV: No Chest pain  GI: No Nausea/Vomiting   : + improving urinary frequency, dysuria.  MSK: No weakness  Skin: No rashes  Neuro: No Headaches  Psych: No Suicidal ideations    All remaining systems reviewed and found to be negative, except as stated above.      Exam: /62 (BP Location: Left arm, Patient Position: Sitting, BP Cuff Size: Adult)   Pulse 100   Temp 37.5 °C (99.5 °F) (Temporal)   Resp 18   Ht 1.575 m (5' 2\")   Wt 72.1 kg (159 lb)   SpO2 95%  Body mass index is 29.08 kg/m².    General: Well nourished, well developed female in NAD, awake and conversant.  Eyes: " Normal conjunctiva, anicteric.  Round symmetrical pupils.  ENT: Hearing grossly intact.  No nasal discharge.  Neck: Neck is supple.  No masses or thyromegaly.  CV: No lower extremity edema.  Respiratory: Respirations are nonlabored.  No wheezing.  Abdomen: Non-Distended.  Skin: Warm.  No rashes or ulcers.  MSK: Normal ambulation.  No clubbing or cyanosis.  Neuro: Sensation and CN II-XII grossly normal.  Psych: Alert and oriented.  Cooperative, appropriate mood and affect, normal judgment.    Assessment/Plan:  1. Dysuria  2. Frequent UTI  3. Group B streptococcal infection  Recurrent problem for the patient.  Plan to start amoxicillin 875 mg/BID for 7 days.  Encouraged patient to take probiotic daily.  Referral to urology for recurrent UTI's.  - REFERRAL TO UROLOGY  - REFERRAL TO GYNECOLOGY  - amoxicillin (AMOXIL) 875 MG tablet; Take 1 Tab by mouth 2 times a day for 7 days.  Dispense: 14 Tab; Refill: 0    4. Atypical squamous cells of undetermined significance (ASCUS) on Papanicolaou smear of cervix  Chronic, ongoing.  Referral to gynecology, due for repeat pap.  - REFERRAL TO GYNECOLOGY     Educated in proper administration of medication(s) ordered today including safety, possible SE, risks, benefits, rationale and alternatives to therapy.   Supportive care, differential diagnoses, and indications for immediate follow-up discussed with patient.    Pathogenesis of diagnosis discussed including typical length and natural progression.    Instructed to return to clinic or nearest emergency department for any change in condition, further concerns, or worsening of symptoms.  Patient states understanding of the plan of care and discharge instructions.    Return if symptoms worsen or fail to improve.    Please note that this dictation was created using voice recognition software. I have made every reasonable attempt to correct obvious errors, but I expect that there are errors of grammar and possibly content that I did not  discover before finalizing the note.

## 2020-11-17 NOTE — LETTER
[facility]  MATTHEW Ames  [address]  [place] [address]  Fax: [phone]   Authorization for Release/Disclosure of   Protected Health Information   Name: KARUNA JIM : 1995 SSN: [id number]   Address: [address] [place] [address]  [place] [address] Phone:    [phone] (home) [phone] (work)   I authorize the entity listed below to release/disclose the PHI below to:   [facility]/MATTHEW Ames and MATTHEW Ames   Provider or Entity Name:  Dr. Jennie Vaughn   Address   City, [facility]   Phone:      Fax:     Reason for request: continuity of care   Information to be released:    [  ] LAST COLONOSCOPY,  including any PATH REPORT and follow-up  [  ] LAST FIT/COLOGUARD RESULT [  ] LAST DEXA  [  ] LAST MAMMOGRAM  [  ] LAST PAP  [  ] LAST LABS [  ] RETINA EXAM REPORT  [  ] IMMUNIZATION RECORDS  [X] Release all info      [  ] Check here and initial the line next to each item to release ALL health information INCLUDING  _____ Care and treatment for drug and / or alcohol abuse  _____ HIV testing, infection status, or AIDS  _____ Genetic Testing    DATES OF SERVICE OR TIME PERIOD TO BE DISCLOSED: _____________  I understand and acknowledge that:  * This Authorization may be revoked at any time by you in writing, except if your health information has already been used or disclosed.  * Your health information that will be used or disclosed as a result of you signing this authorization could be re-disclosed by the recipient. If this occurs, your re-disclosed health information may no longer be protected by State or Federal laws.  * You may refuse to sign this Authorization. Your refusal will not affect your ability to obtain treatment.  * This Authorization becomes effective upon signing and will  on (date) __________.      If no date is indicated, this Authorization will  one (1) year from the signature date.    Name: Karuna Jim    Signature:   Date:     11/17/2020       PLEASE FAX REQUESTED RECORDS BACK TO: [phone]

## 2020-11-17 NOTE — ASSESSMENT & PLAN NOTE
"Recurrent problem for the patient.  Patient was seen in urgent care on 2020 for dysuria and urinary frequency.  Urinalysis and urine culture was obtained.  Patient states that before leaving urgent care she was told to start antibiotics while waiting for culture results, states that she went to her pharmacy and antibiotics were never sent.  She called the urgent care to follow-up on the prescription and was told that it would be sent, she returned to her pharmacy and there was still no prescription for an antibiotic.  The patient states that she is going out of town the following weekend, so she called again to the urgent care on Friday to discuss antibiotic prescription, states she was transferred twice and finally ended at a voicemail so she \"gave up\".  Reports that initial symptoms had improved since her urgent care visit, but her urine continues to be cloudy.  Urine culture results received on 11/15/2020, message from urgent care provider advised her to \"stop the antibiotics and follow-up with primary care and urology\".  She was given a referral to urology, which has not been assigned.  Patient states that she was also told she had a group B strep infection which can cause problems with the  and she should advise her OB of the positive bacteria on urine culture if she becomes pregnant.  Patient states that the message was confusing and she is unclear if this will prevent her from having pregnancies in the future.  Patient reports that she is frustrated with her urgent care experience and the poor follow-up.  "

## 2021-01-22 ENCOUNTER — HOSPITAL ENCOUNTER (OUTPATIENT)
Facility: MEDICAL CENTER | Age: 26
End: 2021-01-22
Attending: OBSTETRICS & GYNECOLOGY
Payer: COMMERCIAL

## 2021-01-22 ENCOUNTER — GYNECOLOGY VISIT (OUTPATIENT)
Dept: OBGYN | Facility: CLINIC | Age: 26
End: 2021-01-22
Payer: COMMERCIAL

## 2021-01-22 VITALS
BODY MASS INDEX: 29.59 KG/M2 | HEIGHT: 62 IN | WEIGHT: 160.8 LBS | DIASTOLIC BLOOD PRESSURE: 81 MMHG | SYSTOLIC BLOOD PRESSURE: 142 MMHG

## 2021-01-22 DIAGNOSIS — Z12.4 CERVICAL CANCER SCREENING: ICD-10-CM

## 2021-01-22 DIAGNOSIS — Z01.419 ENCOUNTER FOR ANNUAL ROUTINE GYNECOLOGICAL EXAMINATION: ICD-10-CM

## 2021-01-22 PROCEDURE — 87591 N.GONORRHOEAE DNA AMP PROB: CPT

## 2021-01-22 PROCEDURE — 88175 CYTOPATH C/V AUTO FLUID REDO: CPT

## 2021-01-22 PROCEDURE — 87624 HPV HI-RISK TYP POOLED RSLT: CPT

## 2021-01-22 PROCEDURE — 99395 PREV VISIT EST AGE 18-39: CPT | Performed by: OBSTETRICS & GYNECOLOGY

## 2021-01-22 PROCEDURE — 87491 CHLMYD TRACH DNA AMP PROBE: CPT

## 2021-01-22 SDOH — HEALTH STABILITY: MENTAL HEALTH: HOW OFTEN DO YOU HAVE A DRINK CONTAINING ALCOHOL?: MONTHLY OR LESS

## 2021-01-22 NOTE — NON-PROVIDER
Pt here as New Pt for Gyn exam  Good Phone #:[phone]  Pharmacy verified.  Last Pap:7/15/19, ASCUS  LMP:1/4/2021  Pt states would like to establish care.  Pt states would like to have a well woman exam with pap.

## 2021-01-22 NOTE — PROGRESS NOTES
Well woman visit     Karuna Shane is a 25 y.o.  who presents to \[facility]\"" care for her Annual Exam.      GYN History:  LMP: 20  Menarche: 12  Menses: regular- on an OCP    Last pap: 7/15/19- ASCUS  Sexually active: yes  History of STDs: gonorrhea  Fam Hx of breast, ovarian, or colon cancer: no     OB History:        Health Maintenance:  Last mammogram: no concerns  Immunizations: Had Gardasil     Review of Systems:   Pertinent positives documented in HPI and all other systems reviewed & are negative.    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 84 Tab 3     No current Epic-ordered facility-administered medications on file.        Allergies: Patient has no known allergies.    Social History:  Social History     Socioeconomic History   • Marital status: Single     Spouse name: Not on file   • Number of children: Not on file   • Years of education: Not on file   • Highest education level: Not on file   Occupational History   • Not on file   Social Needs   • Financial resource strain: Not on file   • Food insecurity     Worry: Not on file     Inability: Not on file   • Transportation needs     Medical: Not on file     Non-medical: Not on file   Tobacco Use   • Smoking status: Never Smoker   • Smokeless tobacco: Never Used   Substance and Sexual Activity   • Alcohol use: Yes     Frequency: Monthly or less     Comment: socially   • Drug use: Yes     Types: Marijuana   • Sexual activity: Yes     Partners: Male     Birth control/protection: Pill  "    Comment: Not  and working.   Lifestyle   • Physical activity     Days per week: Not on file     Minutes per session: Not on file   • Stress: Not on file   Relationships   • Social connections     Talks on phone: Not on file     Gets together: Not on file     Attends Tenriism service: Not on file     Active member of club or organization: Not on file     Attends meetings of clubs or organizations: Not on file     Relationship status: Not on file   • Intimate partner violence     Fear of current or ex partner: Not on file     Emotionally abused: Not on file     Physically abused: Not on file     Forced sexual activity: Not on file   Other Topics Concern   • Not on file   Social History Narrative   • Not on file       Family History:  Family History   Problem Relation Age of Onset   • No Known Problems Mother    • No Known Problems Father    • Alcohol/Drug Sister         drugs   • Obesity Brother    • Diabetes Maternal Grandmother    • Cancer Maternal Grandfather         pancreatic cancer   • GI Disease Paternal Grandmother         Liver   • Arthritis Paternal Grandmother    • Other Paternal Grandmother         Chronic back pain   • No Known Problems Paternal Grandfather            Objective:   Vitals:  /81 (BP Location: Right arm, Patient Position: Sitting, BP Cuff Size: Adult)   Ht 1.575 m (5' 2\")   Wt 72.9 kg (160 lb 12.8 oz)   Body mass index is 29.41 kg/m². (Goal BM I>18 <25)    Physical Exam:   Nursing note and vitals reviewed.  Physical Exam   Constitutional: She is oriented to person, place, and time and well-developed, well-nourished, and in no distress.   HENT:   Head: Normocephalic and atraumatic.   Right Ear: External ear normal.   Eyes: Pupils are equal, round, and reactive to light. Conjunctivae are normal.   Neck: Normal range of motion. Neck supple.   Cardiovascular: Normal rate, regular rhythm and normal heart sounds.   Pulmonary/Chest: Effort normal and breath sounds normal. "   Abdominal: Soft. Bowel sounds are normal. She exhibits no distension. There is no abdominal tenderness.   Musculoskeletal: Normal range of motion.   Neurological: She is alert and oriented to person, place, and time. Gait normal.   Skin: Skin is warm and dry.   Psychiatric: Mood, memory, affect and judgment normal.     Genitourinary:  Normal appearing external female genitalia without any rashes, lesions, labial fusion or tenderness.  Vagina is pink moist and well rugated. Physiologic discharge present within vaginal vault.  Cervix well visualized without masses or lesions.  On bimanual exam there is no cervical motion tenderness, uterus is midline and retroverted, not enlarged, fixed, or tender.  No adnexal masses or tenderness.   Breast: No masses, skin dimpling, or lymphadenopathy noted bilaterally.  No nipple discharge.  Nipples everted.      Assessment/Plan:     1. Cervical cancer screening  THINPREP PAP W/HPV AND CTNG       Karuna Shane is a 25 y.o.  female who presents for her annual gynecologic exam.   # Preventative health care:   --Breast self awareness discussed.   --Cervical cancer screening. Last Pap - ASCUS. Collected today. HPV sent. I will follow up with patient once results are back as per ASCCP guidelines.   --Smoking - not a smoker.   --Colorectal screening not indicated.   --Immunizations are up to date.  --Diet, exercise, vitamin supplementation, and hydration discussed.  # Contraception: OCP  # STD screening: GC/C off of pap  # Follow up annually or prn.

## 2021-01-23 DIAGNOSIS — Z12.4 CERVICAL CANCER SCREENING: ICD-10-CM

## 2021-01-25 LAB
C TRACH DNA GENITAL QL NAA+PROBE: NEGATIVE
CYTOLOGY REG CYTOL: NORMAL
HPV HR 12 DNA CVX QL NAA+PROBE: NEGATIVE
HPV16 DNA SPEC QL NAA+PROBE: NEGATIVE
HPV18 DNA SPEC QL NAA+PROBE: NEGATIVE
N GONORRHOEA DNA GENITAL QL NAA+PROBE: NEGATIVE
SPECIMEN SOURCE: NORMAL
SPECIMEN SOURCE: NORMAL

## 2021-01-30 DIAGNOSIS — Z30.41 ENCOUNTER FOR SURVEILLANCE OF CONTRACEPTIVE PILLS: ICD-10-CM

## 2021-01-30 RX ORDER — NORGESTIMATE AND ETHINYL ESTRADIOL
1 KIT DAILY
Qty: 84 TAB | Refills: 3 | Status: SHIPPED | OUTPATIENT
Start: 2021-01-30 | End: 2021-11-12 | Stop reason: SDUPTHER

## 2021-03-01 ENCOUNTER — HOSPITAL ENCOUNTER (OUTPATIENT)
Dept: LAB | Facility: MEDICAL CENTER | Age: 26
End: 2021-03-01
Attending: OBSTETRICS & GYNECOLOGY
Payer: COMMERCIAL

## 2021-03-01 ENCOUNTER — GYNECOLOGY VISIT (OUTPATIENT)
Dept: OBGYN | Facility: CLINIC | Age: 26
End: 2021-03-01
Payer: COMMERCIAL

## 2021-03-01 VITALS — WEIGHT: 159.4 LBS | SYSTOLIC BLOOD PRESSURE: 153 MMHG | DIASTOLIC BLOOD PRESSURE: 70 MMHG | BODY MASS INDEX: 29.15 KG/M2

## 2021-03-01 DIAGNOSIS — N90.9 IRRITATION OF EXTERNAL FEMALE GENITALIA: ICD-10-CM

## 2021-03-01 DIAGNOSIS — R53.83 FATIGUE, UNSPECIFIED TYPE: ICD-10-CM

## 2021-03-01 DIAGNOSIS — F32.0 CURRENT MILD EPISODE OF MAJOR DEPRESSIVE DISORDER WITHOUT PRIOR EPISODE (HCC): ICD-10-CM

## 2021-03-01 LAB
T4 FREE SERPL-MCNC: 1.13 NG/DL (ref 0.93–1.7)
TSH SERPL DL<=0.005 MIU/L-ACNC: 2.53 UIU/ML (ref 0.38–5.33)

## 2021-03-01 PROCEDURE — 84439 ASSAY OF FREE THYROXINE: CPT

## 2021-03-01 PROCEDURE — 84443 ASSAY THYROID STIM HORMONE: CPT

## 2021-03-01 PROCEDURE — 99214 OFFICE O/P EST MOD 30 MIN: CPT | Performed by: OBSTETRICS & GYNECOLOGY

## 2021-03-01 PROCEDURE — 36415 COLL VENOUS BLD VENIPUNCTURE: CPT

## 2021-03-01 PROCEDURE — 87529 HSV DNA AMP PROBE: CPT

## 2021-03-01 RX ORDER — BUPROPION HYDROCHLORIDE 150 MG/1
150 TABLET ORAL EVERY MORNING
Qty: 30 TABLET | Refills: 11 | Status: SHIPPED | OUTPATIENT
Start: 2021-03-01 | End: 2021-07-21

## 2021-03-01 NOTE — PROGRESS NOTES
GYN Consult    CC/reason for consult: wants hormone levels checked    HPI: Karuna Shane is a 25 y.o.  with desire to have hormones checked. States there are moments when she feels exhausted and she won't do anything all day. States she is very emotional- much more than usual. This is especially worse when she is on her period. Thinks the exhaustion really started happening about a year ago. Used to be highly active and lately has felt incredibly unmotivated. Periods are regular, come every month. Bleeds 4-7 days.   Has gained approximately 10 lbs in the last year- attributes this to stopping softball but stopped 4-5 years ago. States her nails are always fragile. Hasn't noticed any hair loss/ hair breakage. Denies sensitivity to cold.   Is currently on OCP- has been taking it for the last 4-5 years.   Sleeping- has bad nights occasionally. Takes THC gummies at night to help her sleep throughout the night. She has been doing this for the last month.   About 6 months ago, was having severe back pain/ back spasms. Chiropractor- [facility]. That is now improved. She started using the THC gummies to help relax her back so that she can sleep.       ROS:  constitutional: denies fevers, general concerns  CV: denies chest pain, palpitations, edema  Resp: denies shortness of breath, cough  GI: denies abd pain, N/V, diarrhea/constipation, blood in stool  : denies irregular vaginal bleeding, discharge, pain, denies urinary complaints  Neuro: denies hx of migraines w/ aura  Endo: denies significant weight changes, irregular menses, temperature intolerance, denies hotflashes/nightsweats  Heme/lymph: denies easy bleeding/bruising, denies swollen glands  Psych: denies concerns about mood, denies SI  Allergy: denies concerns        OB History    Para Term  AB Living   0 0 0 0 0 0   SAB TAB Ectopic Molar Multiple Live Births   0 0 0 0 0 0       Past Medical History:   Diagnosis Date   • Asthma     • Band, 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 30 tablet 11   • TRI-LO-SPRINTEC 0.18/0.215/0.25 MG-25 MCG Tab Take 1 Tab by mouth every day. 84 Tab 3     No current Epic-ordered facility-administered medications on file.       Allergies: Patient has no known allergies.    Social History     Socioeconomic History   • Marital status: Single     Spouse name: Not on file   • Number of children: Not on file   • Years of education: Not on file   • Highest education level: Not on file   Occupational History   • Not on file   Tobacco Use   • Smoking status: Never Smoker   • Smokeless tobacco: Never Used   Substance and Sexual Activity   • Alcohol use: Yes     Comment: socially   • Drug use: Yes     Types: Marijuana   • Sexual activity: Yes     Partners: Male     Birth control/protection: Pill     Comment: Not  and working.   Other Topics Concern   • Not on file   Social History Narrative   • Not on file     Social Determinants of Health     Financial Resource Strain:    • Difficulty of Paying Living Expenses:    Food Insecurity:    • Worried About Running Out of Food in the Last Year:    • Ran Out of Food in the Last Year:    Transportation Needs:    • Lack of Transportation (Medical):    • Lack of Transportation (Non-Medical):    Physical Activity:    • Days of Exercise per Week:    • Minutes of Exercise per Session:    Stress:    • Feeling of Stress :    Social Connections:    • Frequency of Communication with Friends and Family:    • Frequency of Social Gatherings with Friends and Family:    • Attends Holiness Services:    • Active Member of Clubs or Organizations:    • Attends Club or Organization Meetings:    • Marital  Status:    Intimate Partner Violence:    • Fear of Current or Ex-Partner:    • Emotionally Abused:    • Physically Abused:    • Sexually Abused:        Family History   Problem Relation Age of Onset   • No Known Problems Mother    • No Known Problems Father    • Alcohol/Drug Sister         drugs   • Obesity Brother    • Diabetes Maternal Grandmother    • Cancer Maternal Grandfather         pancreatic cancer   • GI Disease Paternal Grandmother         Liver   • Arthritis Paternal Grandmother    • Other Paternal Grandmother         Chronic back pain   • No Known Problems Paternal Grandfather          Physical Exam:  /70 (BP Location: Right arm, Patient Position: Sitting, BP Cuff Size: Adult)   Wt 72.3 kg (159 lb 6.4 oz)   BMI 29.15 kg/m²   gen: AAO, NAD, affect appropriate  CV: no LE edema  Resp: Symmetric non labored breathing  Skin: warm/dry, no lesions  Psych: normal mood, normal affect. Seems tired and sad.     A/P: 25 y.o.  with   1. Irritation of external female genitalia  HSV 1/2 PCR    CANCELED: HSV (HERPES SIMPLEX VIRUS) BY PCR (BLOOD)   2. Fatigue, unspecified type  TSH+FREE T4   3. Current mild episode of major depressive disorder without prior episode (HCC)  buPROPion (WELLBUTRIN XL) 150 MG XL tablet    REFERRAL TO BEHAVIORAL HEALTH     I explained to Lidia today that checking her hormones would not be of very much benefit because she is on an oral contraceptive pill which would alter the results.  We are going to check a thyroid level.  She states that her mother has a history of thyroid issues as well as depression.  I discussed with Lidia that it sounds as though she might be going through a mild period of depression.  She admits that this could also be what is going on.  We discussed whether or not she has any component of anxiety and it does not appear that she has a very large component of anxiety.  Therefore I am putting her on Wellbutrin.  We discussed that this can be an activating  medication and that she is to discontinue use if she has any thoughts of self-harm or suicidal ideation.  I also placed a referral for behavioral health for counseling.  She will follow-up in 6 weeks to discuss how this is working for her.    25 minutes were spent with patient more than 50% of that time spent face-to-face counseling, educating, and coordinating care.

## 2021-03-01 NOTE — NON-PROVIDER
Pt here for Gyn visit  Good Phone#: [phone]  Pharmacy verified.  Pt states would like to get some Lab works done to check hormone levels and HSV 1 and 2.  Pt states no other complaints for today.

## 2021-03-05 LAB
HSV DNA SPEC QL NAA+PROBE: NOT DETECTED
SPECIMEN SOURCE: NORMAL

## 2021-05-03 ENCOUNTER — HOSPITAL ENCOUNTER (OUTPATIENT)
Dept: RADIOLOGY | Facility: MEDICAL CENTER | Age: 26
End: 2021-05-03
Attending: PHYSICIAN ASSISTANT
Payer: COMMERCIAL

## 2021-05-03 ENCOUNTER — OFFICE VISIT (OUTPATIENT)
Dept: URGENT CARE | Facility: PHYSICIAN GROUP | Age: 26
End: 2021-05-03
Payer: COMMERCIAL

## 2021-05-03 VITALS
SYSTOLIC BLOOD PRESSURE: 104 MMHG | OXYGEN SATURATION: 98 % | RESPIRATION RATE: 16 BRPM | BODY MASS INDEX: 29.44 KG/M2 | HEART RATE: 94 BPM | DIASTOLIC BLOOD PRESSURE: 72 MMHG | TEMPERATURE: 98.1 F | HEIGHT: 62 IN | WEIGHT: 160 LBS

## 2021-05-03 DIAGNOSIS — S93.601A SPRAIN OF RIGHT FOOT, INITIAL ENCOUNTER: ICD-10-CM

## 2021-05-03 DIAGNOSIS — M79.671 RIGHT FOOT PAIN: ICD-10-CM

## 2021-05-03 PROCEDURE — 73630 X-RAY EXAM OF FOOT: CPT | Mod: RT

## 2021-05-03 PROCEDURE — 99214 OFFICE O/P EST MOD 30 MIN: CPT | Performed by: PHYSICIAN ASSISTANT

## 2021-05-03 ASSESSMENT — ENCOUNTER SYMPTOMS
PALPITATIONS: 0
BLURRED VISION: 0
VOMITING: 0
CHILLS: 0
FEVER: 0
NAUSEA: 0
TINGLING: 0
SHORTNESS OF BREATH: 0
SENSORY CHANGE: 0
SORE THROAT: 0

## 2021-05-03 NOTE — PROGRESS NOTES
"Subjective:      Karuna Shane is a 25 y.o. female who presents with Foot Pain (right foot pain in arch x 12 hours)    HPI:  Karuna Shane is a 25 y.o. female who presents today for evaluation of right foot pain.  Patient reports that last night she was trying to brace her nephew.  As soon as she took off for the splint she felt a sharp pain in the arch of her right foot.  She states that last night she tried to \"rub it out\" and rest it but this morning she woke up and the pain had actually significantly worsened.  She says that she has pain to walk on the medial aspect of her foot at all.  She has been having to walk on the outside of her right foot.  She tried applying a heating pad this morning which did not help at all.  She has not had anything else.  No numbness or tingling.  Denies any injury.  She does note that at one point earlier this morning while she was walking she did note a pain shoot up the medial aspect of her right ankle into the distal aspect of her leg as well.      Review of Systems   Constitutional: Negative for chills and fever.   HENT: Negative for sore throat.    Eyes: Negative for blurred vision.   Respiratory: Negative for shortness of breath.    Cardiovascular: Negative for chest pain and palpitations.   Gastrointestinal: Negative for nausea and vomiting.   Musculoskeletal: Negative for joint pain.        Right foot pain   Neurological: Negative for tingling and sensory change.       PMH:  has a past medical history of Asthma and Band, amniotic (1994).  MEDS:   Current Outpatient Medications:   •  buPROPion (WELLBUTRIN XL) 150 MG XL tablet, Take 1 tablet by mouth every morning., Disp: 30 tablet, Rfl: 11  •  TRI-LO-SPRINTEC 0.18/0.215/0.25 MG-25 MCG Tab, Take 1 Tab by mouth every day., Disp: 84 Tab, Rfl: 3  ALLERGIES: No Known Allergies  SURGHX:   Past Surgical History:   Procedure Laterality Date   • MENISCUS REPAIR Right 2011     SOCHX:  reports that she has never " "smoked. She has never used smokeless tobacco. She reports current alcohol use. She reports current drug use. Drug: Marijuana.  FH: Family history was reviewed, no pertinent findings to report     Objective:     /72 (BP Location: Left arm, Patient Position: Sitting, BP Cuff Size: Adult)   Pulse 94   Temp 36.7 °C (98.1 °F) (Temporal)   Resp 16   Ht 1.575 m (5' 2\")   Wt 72.6 kg (160 lb)   SpO2 98%   BMI 29.26 kg/m²      Physical Exam  Constitutional:       Appearance: She is well-developed.   HENT:      Head: Normocephalic and atraumatic.      Right Ear: External ear normal.      Left Ear: External ear normal.   Eyes:      Conjunctiva/sclera: Conjunctivae normal.      Pupils: Pupils are equal, round, and reactive to light.   Cardiovascular:      Rate and Rhythm: Normal rate and regular rhythm.      Heart sounds: Normal heart sounds. No murmur.   Pulmonary:      Effort: Pulmonary effort is normal.      Breath sounds: Normal breath sounds. No wheezing.   Musculoskeletal:      Comments: Medial aspect of right arch is diffusely tender to palpation with overlying soft tissue swelling noted.  No erythema or ecchymosis.  Patient has mildly decreased range of motion with flexion and extension of the right great toe secondary to pain.  5/5 strength of lower extremities bilaterally.  No tenderness at the ankle.  Distal neurovascular intact.  Cap refill less than 2 seconds.  Antalgic gait.   Skin:     General: Skin is warm and dry.      Capillary Refill: Capillary refill takes less than 2 seconds.   Neurological:      Mental Status: She is alert and oriented to person, place, and time.   Psychiatric:         Behavior: Behavior normal.         Judgment: Judgment normal.             DX-FOOT-COMPLETE 3+ RIGHT  FINDINGS:  No acute fracture or malalignment. Soft tissues are unremarkable.     IMPRESSION:     No acute fracture identified.       *X-rays were reviewed and interpreted independently by me. I agree with the " radiologist's findings     Assessment/Plan:     1. Right foot pain  - DX-FOOT-COMPLETE 3+ RIGHT; Future  - REFERRAL TO SPORTS MEDICINE    2. Sprain of right foot, initial encounter  - REFERRAL TO SPORTS MEDICINE  *Exam is consistent with strain or tendinitis of the right flexor hallucis longus. Patient was placed in a walking boot and advised to be weightbearing as tolerated.  Also recommend that patient apply ice to the affected area multiple times per day and use OTC analgesics as needed.          Differential Diagnosis, natural history, and supportive care discussed. Return to the Urgent Care or follow up with your PCP if symptoms fail to resolve, or for any new or worsening symptoms. Emergency room precautions discussed. Patient and/or family appears understanding of information.

## 2021-05-24 ENCOUNTER — OFFICE VISIT (OUTPATIENT)
Dept: MEDICAL GROUP | Facility: CLINIC | Age: 26
End: 2021-05-24
Payer: COMMERCIAL

## 2021-05-24 VITALS
TEMPERATURE: 98.3 F | RESPIRATION RATE: 16 BRPM | OXYGEN SATURATION: 96 % | HEIGHT: 62 IN | BODY MASS INDEX: 29.44 KG/M2 | HEART RATE: 84 BPM | DIASTOLIC BLOOD PRESSURE: 80 MMHG | SYSTOLIC BLOOD PRESSURE: 122 MMHG | WEIGHT: 160 LBS

## 2021-05-24 DIAGNOSIS — M72.2 PLANTAR FASCIITIS, RIGHT: ICD-10-CM

## 2021-05-24 PROCEDURE — 99203 OFFICE O/P NEW LOW 30 MIN: CPT | Performed by: FAMILY MEDICINE

## 2021-05-24 ASSESSMENT — ENCOUNTER SYMPTOMS
DIZZINESS: 0
SHORTNESS OF BREATH: 0
NAUSEA: 0
FEVER: 0
CHILLS: 0
VOMITING: 0

## 2021-05-24 NOTE — Clinical Note
Henry Lowe,  Thank you for referring Karuna to our sports medicine clinic.  Fortunately, she seems to be getting better.  We discussed her return to activity and we will see her back in 1 month.  If she still symptomatic or has worsening of her symptoms we will do a musculoskeletal ultrasound evaluation of her plantar fascia.  Thanks!  OCTAVIO

## 2021-05-24 NOTE — PROGRESS NOTES
Subjective:      Karuna Shane is a 25 y.o. female who presents with Foot Problem (Referral from UC/ R foot pain )     Referred by Chrissy Owen P.A.-C. for evaluation of RIGHT foot pain    HPI  RIGHT foot pain  Date of onset, Monday, May 3, 2021  Arch of RIGHT foot  Insidious onset, was running/racing with nephew  After pushing off she felt some soreness in the RIGHT plantar aspect of the foot  The following day the pain was WORSE and she found difficulty bearing weight on the RIGHT foot  He noted urgent care, had x-rays and placed in a cam walker boot  Used to the boot for 4 days  Noticed pain early on which improved over time  Walking produces some slight pressure but has been painless, then she tried jogging which reexacerbated her pain    Was working at [employer], currently unemployed  Activities include work-up challenges, attends gym    Review of Systems   Constitutional: Negative for chills and fever.   Respiratory: Negative for shortness of breath.    Cardiovascular: Negative for chest pain.   Gastrointestinal: Negative for nausea and vomiting.   Neurological: Negative for dizziness.     PMH:  has a past medical history of Asthma and Band, amniotic (1994).  MEDS:   Current Outpatient Medications:   •  buPROPion (WELLBUTRIN XL) 150 MG XL tablet, Take 1 tablet by mouth every morning., Disp: 30 tablet, Rfl: 11  •  TRI-LO-SPRINTEC 0.18/0.215/0.25 MG-25 MCG Tab, Take 1 Tab by mouth every day., Disp: 84 Tab, Rfl: 3  ALLERGIES: No Known Allergies  SURGHX:   Past Surgical History:   Procedure Laterality Date   • MENISCUS REPAIR Right 2011     SOCHX:  reports that she has never smoked. She has never used smokeless tobacco. She reports current alcohol use. She reports current drug use. Drug: Marijuana.  FH: Family history was reviewed, no pertinent findings to report     Objective:     /80 (BP Location: Left arm, Patient Position: Sitting, BP Cuff Size: Adult)   Pulse 84  "  Temp 36.8 °C (98.3 °F) (Temporal)   Resp 16   Ht 1.575 m (5' 2\")   Wt 72.6 kg (160 lb)   SpO2 96%   BMI 29.26 kg/m²      Physical Exam       RIGHT ANKLE:  There is NO swelling noted at the ankle  Range of motion intact with dorsiflexion and plantarflexion, inversion and eversion  There is NO tenderness of the ATFL, CF or PTF ligament  There is NO tenderness of the lateral malleolus or medial malleolus  Anterior drawer testing is NEGATIVE  Talar tilt testing is NEGATIVE  The foot and ankle is otherwise neurovascularly intact    RIGHT FOOT:  There is NO swelling noted at the foot  There is NO tenderness at the base of the fifth metatarsal, cuboid, or tarsal navicular  There is NO pain with metatarsal squeeze test  MINIMAL tenderness with palpation the plantar fascia along the arch    LEFT ANKLE:  There is NO swelling noted at the ankle  Range of motion intact with dorsiflexion and plantarflexion, inversion and eversion  There is NO tenderness of the ATFL, CF or PTF ligament  There is NO tenderness of the lateral malleolus or medial malleolus  Anterior drawer testing is NEGATIVE  Talar tilt testing is NEGATIVE  The foot and ankle is otherwise neurovascularly intact    LEFT FOOT:  There is NO swelling noted at the foot  There is NO tenderness at the base of the fifth metatarsal, cuboid, or tarsal navicular  There is NO pain with metatarsal squeeze test    NEUTRAL stance  Able to ambulate with NORMAL gait      Assessment/Plan:        1. Plantar fasciitis, right       Date of onset, Monday, May 3, 2021  Arch of RIGHT foot  Insidious onset, was running/racing with nephew    Suspect she strained her plantar fascia  Recommend elliptical instead of treadmill or excessive walking  Since she still has a \"pressure\" underneath the foot, recommend avoiding trying to run or walk on the treadmill until she feels that pressure go away    Return in about 4 weeks (around 6/21/2021).  To see how she is doing with gradual return " to activity and see how she is doing with her foot and ankle home exercise program  We can consider musculoskeletal ultrasound evaluation of the plantar fascia if symptoms persist or fail to improve    5/3/2021 12:58 PM     HISTORY/REASON FOR EXAM:  Right foot pain following injury        TECHNIQUE/EXAM DESCRIPTION AND NUMBER OF VIEWS:  3 nonweightbearing views of the RIGHT foot.     COMPARISON:  3/2/2020     FINDINGS:  No acute fracture or malalignment. Soft tissues are unremarkable.     IMPRESSION:     No acute fracture identified.                 Interpreted in the office today with the patient    Thank you Chrissy Owen P.A.-C. for allowing me to participate in caring for your patient.

## 2021-07-21 ENCOUNTER — OFFICE VISIT (OUTPATIENT)
Dept: MEDICAL GROUP | Facility: PHYSICIAN GROUP | Age: 26
End: 2021-07-21
Payer: COMMERCIAL

## 2021-07-21 VITALS
SYSTOLIC BLOOD PRESSURE: 98 MMHG | WEIGHT: 160 LBS | DIASTOLIC BLOOD PRESSURE: 66 MMHG | HEART RATE: 67 BPM | BODY MASS INDEX: 29.44 KG/M2 | HEIGHT: 62 IN | TEMPERATURE: 97.6 F | OXYGEN SATURATION: 98 %

## 2021-07-21 DIAGNOSIS — J45.990 EXERCISE-INDUCED ASTHMA: ICD-10-CM

## 2021-07-21 PROBLEM — A49.1 GROUP B STREPTOCOCCAL INFECTION: Status: RESOLVED | Noted: 2020-11-17 | Resolved: 2021-07-21

## 2021-07-21 PROCEDURE — 99214 OFFICE O/P EST MOD 30 MIN: CPT | Performed by: NURSE PRACTITIONER

## 2021-07-21 RX ORDER — ALBUTEROL SULFATE 90 UG/1
1-2 AEROSOL, METERED RESPIRATORY (INHALATION) EVERY 4 HOURS PRN
Qty: 8.5 G | Refills: 11 | Status: SHIPPED | OUTPATIENT
Start: 2021-07-21

## 2021-07-21 ASSESSMENT — PATIENT HEALTH QUESTIONNAIRE - PHQ9: CLINICAL INTERPRETATION OF PHQ2 SCORE: 0

## 2021-07-21 NOTE — PROGRESS NOTES
CC:   Chief Complaint   Patient presents with   • Asthma     inhaler, albuterol     HISTORY OF THE PRESENT ILLNESS: Patient is a 25 y.o. female. This pleasant patient is here today to discuss asthma and request inhaler refill.     Health Maintenance: Completed    Exercise-induced asthma  New problem to examiner.  Patient reports that she was diagnosed with exercise-induced asthma in either middle school her freshman year of high school.  She was prescribed an albuterol rescue inhaler to be used prior to exercise and as needed.  Reports that she has been exercising more regularly and needing to use her inhaler more often.  States that she will premedicate with 2 puffs prior to exercise and will sometimes need an additional puff during exercise.  States that it feels like her throat is getting small in her throat will burn.  States that symptoms occur with heavy exercise, sprinting, a lot of exercise quickly.  When at rest, reports that she does not have any difficulties breathing.  She has noticed that sometimes at higher elevations it feels like it is harder to breathe as well.  Requesting medication refill.    Allergies: Patient has no known allergies.  Current Outpatient Medications Ordered in Epic   Medication Sig Dispense Refill   • albuterol 108 (90 Base) MCG/ACT Aero Soln inhalation aerosol Inhale 1-2 Puffs every four hours as needed for Shortness of Breath. 8.5 g 11   • TRI-LO-SPRINTEC 0.18/0.215/0.25 MG-25 MCG Tab Take 1 Tab by mouth every day. 84 Tab 3     No current Epic-ordered facility-administered medications on file.     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   • Group B streptococcal infection 11/17/2020     Past Surgical History:   Procedure Laterality Date   • MENISCUS REPAIR Right 2011     Social History     Tobacco Use   • Smoking status: Never Smoker   • Smokeless tobacco:  "Never Used   Vaping Use   • Vaping Use: Never used   Substance Use Topics   • Alcohol use: Yes     Comment: socially   • Drug use: Yes     Types: Marijuana     Social History     Social History Narrative   • Not on file     Family History   Problem Relation Age of Onset   • No Known Problems Mother    • No Known Problems Father    • Alcohol/Drug Sister         drugs   • Obesity Brother    • Diabetes Maternal Grandmother    • Cancer Maternal Grandfather         pancreatic cancer   • GI Disease Paternal Grandmother         Liver   • Arthritis Paternal Grandmother    • Other Paternal Grandmother         Chronic back pain   • No Known Problems Paternal Grandfather      ROS:   Constitutional: No fevers, chills, malaise/fatigue.  Eyes: No eye pain.  ENT: No sore throat, congestion.   Resp: + dyspnea with exercise. No cough.  CV: No chest pain, leg swelling, palpitations.  GI: No nausea/vomiting, abdominal pain, constipation, diarrhea.  : No dysuria, hematuria.  MSK: No weakness.  Skin: No rashes.  Neuro: No dizziness, weakness, headaches.  Psych: No suicidal ideations.    All remaining systems reviewed and found to be negative, except as stated above.        Exam: BP (!) 98/66 (BP Location: Left arm, Patient Position: Sitting, BP Cuff Size: Adult)   Pulse 67   Temp 36.4 °C (97.6 °F) (Temporal)   Ht 1.575 m (5' 2\")   Wt 72.6 kg (160 lb)   SpO2 98%  Body mass index is 29.26 kg/m².    General:  Normal appearing. No distress.  HEENT:  Normocephalic. Eyes conjunctiva clear lids without ptosis, pupils equal and reactive to light accommodation, ears normal shape and contour.  Pulmonary:  Clear to ausculation.  Normal effort. No rales, rhonchi, or wheezing.  Cardiovascular:  Regular rate and rhythm without murmur. Carotid and radial pulses are intact and equal bilaterally.  Neurologic:  Grossly nonfocal.  Skin:  Warm and dry.  No obvious lesions.  Musculoskeletal: missing fingers on right hand. Normal gait. No extremity " cyanosis, clubbing, or edema.  Psych:  Normal mood and affect. Alert and oriented x3. Judgment and insight is normal.     Assessment/Plan:  1. Exercise-induced asthma  New to examiner, chronic problem for the patient. Refill for albuterol inhaler sent to pharmacy.  Continue to use 2 puffs prior to exercise and 1 to 2 puffs every 4 hours as needed.  - albuterol 108 (90 Base) MCG/ACT Aero Soln inhalation aerosol; Inhale 1-2 Puffs every four hours as needed for Shortness of Breath.  Dispense: 8.5 g; Refill: 11     Educated in proper administration of medication(s) ordered today including safety, possible SE, risks, benefits, rationale and alternatives to therapy.   Supportive care, differential diagnoses, and indications for immediate follow-up discussed with patient.    Pathogenesis of diagnosis discussed including typical length and natural progression.    Instructed to return to clinic or nearest emergency department for any change in condition, further concerns, or worsening of symptoms.  Patient states understanding of the plan of care and discharge instructions.    Return for As needed.    Please note that this dictation was created using voice recognition software. I have made every reasonable attempt to correct obvious errors, but I expect that there are errors of grammar and possibly content that I did not discover before finalizing the note.

## 2021-07-21 NOTE — ASSESSMENT & PLAN NOTE
New problem to examiner.  Patient reports that she was diagnosed with exercise-induced asthma in either middle school her freshman year of high school.  She was prescribed an albuterol rescue inhaler to be used prior to exercise and as needed.  Reports that she has been exercising more regularly and needing to use her inhaler more often.  States that she will premedicate with 2 puffs prior to exercise and will sometimes need an additional puff during exercise.  States that it feels like her throat is getting small in her throat will burn.  States that symptoms occur with heavy exercise, sprinting, a lot of exercise quickly.  When at rest, reports that she does not have any difficulties breathing.  She has noticed that sometimes at higher elevations it feels like it is harder to breathe as well.  Requesting medication refill.

## 2021-11-01 ENCOUNTER — APPOINTMENT (RX ONLY)
Dept: URBAN - METROPOLITAN AREA CLINIC 22 | Facility: CLINIC | Age: 26
Setting detail: DERMATOLOGY
End: 2021-11-01

## 2021-11-01 DIAGNOSIS — D22 MELANOCYTIC NEVI: ICD-10-CM

## 2021-11-01 DIAGNOSIS — L81.4 OTHER MELANIN HYPERPIGMENTATION: ICD-10-CM

## 2021-11-01 DIAGNOSIS — L81.8 OTHER SPECIFIED DISORDERS OF PIGMENTATION: ICD-10-CM

## 2021-11-01 PROBLEM — D48.5 NEOPLASM OF UNCERTAIN BEHAVIOR OF SKIN: Status: ACTIVE | Noted: 2021-11-01

## 2021-11-01 PROCEDURE — ? ADDITIONAL NOTES

## 2021-11-01 PROCEDURE — ? BIOPSY BY SHAVE METHOD

## 2021-11-01 PROCEDURE — ? COUNSELING

## 2021-11-01 PROCEDURE — 11103 TANGNTL BX SKIN EA SEP/ADDL: CPT

## 2021-11-01 PROCEDURE — 11102 TANGNTL BX SKIN SINGLE LES: CPT

## 2021-11-01 PROCEDURE — ? PRESCRIPTION

## 2021-11-01 PROCEDURE — 99213 OFFICE O/P EST LOW 20 MIN: CPT | Mod: 25

## 2021-11-01 RX ORDER — HYDROQUINONE 4 %
1 CREAM (GRAM) TOPICAL
Qty: 1 | Refills: 2 | Status: ERX | COMMUNITY
Start: 2021-11-01

## 2021-11-01 RX ADMIN — Medication 1: at 00:00

## 2021-11-01 ASSESSMENT — LOCATION SIMPLE DESCRIPTION DERM
LOCATION SIMPLE: INFERIOR FOREHEAD
LOCATION SIMPLE: RIGHT NOSE

## 2021-11-01 ASSESSMENT — LOCATION ZONE DERM
LOCATION ZONE: NOSE
LOCATION ZONE: FACE

## 2021-11-01 ASSESSMENT — LOCATION DETAILED DESCRIPTION DERM
LOCATION DETAILED: INFERIOR MID FOREHEAD
LOCATION DETAILED: RIGHT NASAL ALA

## 2021-11-01 NOTE — PROCEDURE: ADDITIONAL NOTES
Detail Level: Detailed
Render Risk Assessment In Note?: no
Additional Notes: Discussed laser treatment for removal of micro bladed eyebrows. Gave pt Carolyn’s card.

## 2021-11-12 DIAGNOSIS — Z30.41 ENCOUNTER FOR SURVEILLANCE OF CONTRACEPTIVE PILLS: ICD-10-CM

## 2021-11-12 RX ORDER — NORGESTIMATE AND ETHINYL ESTRADIOL
1 KIT DAILY
Qty: 84 TABLET | Refills: 3 | Status: SHIPPED | OUTPATIENT
Start: 2021-11-12 | End: 2022-12-25

## 2022-12-13 ENCOUNTER — OFFICE VISIT (OUTPATIENT)
Dept: URGENT CARE | Facility: PHYSICIAN GROUP | Age: 27
End: 2022-12-13
Payer: COMMERCIAL

## 2022-12-13 VITALS
DIASTOLIC BLOOD PRESSURE: 82 MMHG | BODY MASS INDEX: 24.84 KG/M2 | HEART RATE: 97 BPM | HEIGHT: 62 IN | TEMPERATURE: 98.7 F | OXYGEN SATURATION: 99 % | RESPIRATION RATE: 14 BRPM | WEIGHT: 135 LBS | SYSTOLIC BLOOD PRESSURE: 112 MMHG

## 2022-12-13 DIAGNOSIS — L50.9 URTICARIA: ICD-10-CM

## 2022-12-13 PROCEDURE — 99213 OFFICE O/P EST LOW 20 MIN: CPT | Performed by: EMERGENCY MEDICINE

## 2022-12-13 RX ORDER — PREDNISONE 20 MG/1
40 TABLET ORAL DAILY
Qty: 10 TABLET | Refills: 0 | Status: SHIPPED | OUTPATIENT
Start: 2022-12-13 | End: 2022-12-25

## 2022-12-13 RX ORDER — DEXAMETHASONE SODIUM PHOSPHATE 10 MG/ML
10 INJECTION INTRAMUSCULAR; INTRAVENOUS ONCE
Status: COMPLETED | OUTPATIENT
Start: 2022-12-13 | End: 2022-12-13

## 2022-12-13 RX ADMIN — DEXAMETHASONE SODIUM PHOSPHATE 10 MG: 10 INJECTION INTRAMUSCULAR; INTRAVENOUS at 12:39

## 2022-12-13 NOTE — PATIENT INSTRUCTIONS
May take benadryl for itching.  Take steroids as directed.  Please follow up with your allergist if symptoms persist.

## 2022-12-13 NOTE — PROGRESS NOTES
"  Subjective:     Karuna Shane  is a 27 y.o. female who presents for Rash (Rashes that turn into welts x3 days. Noticed yesterday that it feels like throat is closing but theres no pain or soreness. Cough yesterday and this morning. )       Patient is a 27-year-old female who presents for evaluation of hives on and off for 3 days.  She states she started 3 days ago with a spontaneous rash on the inside of her thigh, she shows me photographs of large raised hives.  She states it has come and gone in spite of Benadryl, has been on her trunk, her back.  She also notes that she woke up with allergic shiners under her eyes.  She does not have any respiratory difficulty, any shortness of breath.  She does feel like she has a little bit of a lump in her throat when she swallows however that has not gotten worse.  Patient provides a allergy testing matrix from her allergist that shows numerous environmental allergies.  She states she had a similar episode last November as well.   Review of Systems   Skin:  Positive for itching and rash.   All other systems reviewed and are negative.   Allergies   Allergen Reactions    Other Environmental Unspecified     Trees: [place], [place], [place], [place], Elm, Juniper Mix, Maple, [place], Oak, Olive, Poplar, Privet, Salt [place], [place], [place] Tree, Questa  Grasses:Grass Mixture #7; Bermuda Grass, Sweet Vernal Grass.   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   Constitutional:       Appearance: Normal appearance.   HENT:      Head: Normocephalic and atraumatic.      Mouth/Throat:      Mouth: Mucous membranes are moist.      Pharynx: No oropharyngeal exudate.      Comments: No tongue swelling  Eyes:      General: No scleral icterus.        Right eye: No discharge.         Left eye: No discharge.   Cardiovascular:      Rate and Rhythm: Normal rate and regular rhythm.   Pulmonary:      Effort: Pulmonary effort is normal. No respiratory distress.   Musculoskeletal:      Cervical back: Normal range of motion and neck supple.      Right lower leg: No edema.      Left lower leg: No edema.   Skin:     General: Skin is warm and dry.      Coloration: Skin is not jaundiced.      Findings: No rash.      Comments: Few scattered hives     Neurological:      General: No focal deficit present.      Mental Status: She is alert and oriented to person, place, and time.   Psychiatric:         Mood and Affect: Mood normal.         Behavior: Behavior normal.         Thought Content: Thought content normal.         Assessment/Plan:     Encounter Diagnoses   Name Primary?    Urticaria        Patient with diffuse urticaria, unknown cause/trigger.  No respiratory complaints, no anaphylactic symptoms.  We will treat her with steroids, she can continue Benadryl.  We will also recommend that she follow-up with her allergist for further management.  Reinforced with patient that should she develop respiratory symptoms, tongue swelling, tightening in her throat, she should call 911 because she may require epinephrine.    Assessment    1. Urticaria  - dexamethasone (DECADRON) injection (check route below) 10 mg  - predniSONE (DELTASONE) 20 MG Tab; Take 2 Tablets by mouth every day.  Dispense: 10 Tablet; Refill: 0    See AVS Instructions below for written guidance provided to patient on after-visit management and care in addition to our verbal discussion during the  visit.    Please note that this dictation was created using voice recognition software. I have attempted to correct all errors, but there may be sound-alike, spelling, grammar and possibly content errors that I did not discover before finalizing the note.

## 2022-12-20 ENCOUNTER — OFFICE VISIT (OUTPATIENT)
Dept: MEDICAL GROUP | Facility: PHYSICIAN GROUP | Age: 27
End: 2022-12-20
Payer: COMMERCIAL

## 2022-12-20 VITALS
BODY MASS INDEX: 23.92 KG/M2 | HEIGHT: 62 IN | OXYGEN SATURATION: 98 % | TEMPERATURE: 97.6 F | RESPIRATION RATE: 14 BRPM | DIASTOLIC BLOOD PRESSURE: 74 MMHG | HEART RATE: 88 BPM | WEIGHT: 130 LBS | SYSTOLIC BLOOD PRESSURE: 112 MMHG

## 2022-12-20 DIAGNOSIS — H66.002 NON-RECURRENT ACUTE SUPPURATIVE OTITIS MEDIA OF LEFT EAR WITHOUT SPONTANEOUS RUPTURE OF TYMPANIC MEMBRANE: ICD-10-CM

## 2022-12-20 DIAGNOSIS — J01.40 ACUTE NON-RECURRENT PANSINUSITIS: ICD-10-CM

## 2022-12-20 PROBLEM — H92.02 ACUTE EAR PAIN, LEFT: Status: ACTIVE | Noted: 2022-12-20

## 2022-12-20 PROCEDURE — 99213 OFFICE O/P EST LOW 20 MIN: CPT | Performed by: NURSE PRACTITIONER

## 2022-12-20 RX ORDER — AMOXICILLIN AND CLAVULANATE POTASSIUM 875; 125 MG/1; MG/1
1 TABLET, FILM COATED ORAL 2 TIMES DAILY
Qty: 14 TABLET | Refills: 0 | Status: SHIPPED | OUTPATIENT
Start: 2022-12-20 | End: 2022-12-27

## 2022-12-20 ASSESSMENT — PATIENT HEALTH QUESTIONNAIRE - PHQ9: CLINICAL INTERPRETATION OF PHQ2 SCORE: 0

## 2022-12-20 NOTE — ASSESSMENT & PLAN NOTE
New to examiner.  Patient reports symptoms beginning 1 week ago including a productive cough with clear to yellow sputum, shortness of breath over the past few days, sore throat, left ear pain, left-sided headaches, and starting yesterday eye pain.  She did have COVID in November 2022.  Recently did a home test for COVID and it was negative.

## 2022-12-26 NOTE — PROGRESS NOTES
"CC: Diagnoses of Acute non-recurrent pansinusitis and Non-recurrent acute suppurative otitis media of left ear without spontaneous rupture of tympanic membrane were pertinent to this visit.                                                                                                                                       HPI:   Karuna presents today with the following concerns:    Acute ear pain, left  New to examiner.  Patient reports symptoms beginning 1 week ago including a productive cough with clear to yellow sputum, shortness of breath over the past few days, sore throat, left ear pain, left-sided headaches, and starting yesterday eye pain.  She did have COVID in November 2022.  Recently did a home test for COVID and it was negative.    Patient Active Problem List    Diagnosis Date Noted    Acute ear pain, left 12/20/2022    Exercise-induced asthma 07/21/2021    Jaw pain 07/02/2020    Birth control counseling 07/02/2020    Dysuria 03/05/2020    Atypical squamous cells of undetermined significance (ASCUS) on Papanicolaou smear of cervix 07/21/2019    Vitamin B12 deficiency 07/21/2019    Vitamin D deficiency 07/21/2019    Nevus 07/15/2019    Weight gain 07/04/2019    Solar lentigo 07/02/2019    Chronic fatigue 03/18/2019     Current Outpatient Medications   Medication Sig Dispense Refill    amoxicillin-clavulanate (AUGMENTIN) 875-125 MG Tab Take 1 Tablet by mouth 2 times a day for 7 days. 14 Tablet 0    albuterol 108 (90 Base) MCG/ACT Aero Soln inhalation aerosol Inhale 1-2 Puffs every four hours as needed for Shortness of Breath. 8.5 g 11     No current facility-administered medications for this visit.     Allergies as of 12/20/2022 - Reviewed 12/20/2022   Allergen Reaction Noted    Other environmental Unspecified 12/13/2022      ROS:  See HPI    /74 (BP Location: Left arm, Patient Position: Sitting, BP Cuff Size: Adult)   Pulse 88   Temp 36.4 °C (97.6 °F) (Temporal)   Resp 14   Ht 1.575 m (5' 2\")   " Wt 59 kg (130 lb)   LMP 12/13/2022 (Approximate)   SpO2 98%   BMI 23.78 kg/m²     Physical Exam:  General: Normal appearing. No distress.  HEENT: Left ear purulent effusion filling the middle ear, left bulging TM.  Left TM dull with rim of erythema.   Normocephalic. Eyes conjunctiva clear lids without ptosis, pupils equal and reactive to light accommodation, ears normal shape and contour, canals are clear bilaterally, right tympanic membrane benign, nasal mucosa benign, oropharynx is without erythema, edema or exudates. Sinuses (frontal and maxillary) tender to palpation.  Neck:  Supple without JVD or bruit. Thyroid is not enlarged.  Pulmonary:  Clear to ausculation.  Normal effort. No rales, rhonchi, or wheezing.  Cardiovascular:  Regular rate and rhythm without murmur. Carotid and radial pulses are intact and equal bilaterally.  Neurologic:  Grossly nonfocal.  Lymph:  No cervical, supraclavicular or axillary lymph nodes are palpable.  Skin:  Warm and dry.  No obvious lesions.  Musculoskeletal:  Normal gait. No extremity cyanosis, clubbing, or edema.  Psych:  Normal mood and affect. Alert and oriented x3. Judgment and insight is normal.     Assessment and Plan.   27 y.o. female with the following issues:  1. Acute non-recurrent pansinusitis  2. Non-recurrent acute suppurative otitis media of left ear without spontaneous rupture of tympanic membrane  New to examiner and patient.  Start Augmentin 875-125 mg twice daily for 7 days, complete entire course of antibiotic even if symptoms improve or resolve.  Take with food if symptoms occurs.  Return to be seen if symptoms worsen or do not improve with antibiotics.  --Increase fluid intake, rest.  --Nasal irrigation or a Neti-pot.  --Humidifier in room or hot shower/bath.  --Tylenol and nonsteroidal anti-inflammatories (Advil, Naproxen, ibuprofen).  --Guaifenesin as an expectorant (Mucinex, Robitussin, etc).  --Phenylephrine or pseudoephedrine as a decongestant  (Sudafed, etc).  --Dextromethorphan for cough (Robitussin, Delsym, Triamimic over-the-counter) or Tessalon perles (prescription).  --Intranasal steroids (Flonase, prescription or over-the-counter).  --Intranasal decongestant, oxymetazolin, for 3 days at most (Afrin spray, Mucinex).   - amoxicillin-clavulanate (AUGMENTIN) 875-125 MG Tab; Take 1 Tablet by mouth 2 times a day for 7 days.  Dispense: 14 Tablet; Refill: 0     Return if symptoms worsen or fail to improve.     Please note that this dictation was created using voice recognition software. I have worked with consultants from the vendor as well as technical experts from [facility] to optimize the interface. I have made every reasonable attempt to correct obvious errors, but I expect that there are errors of grammar and possibly content that I did not discover before finalizing the note.